# Patient Record
Sex: MALE | Race: OTHER | HISPANIC OR LATINO | URBAN - METROPOLITAN AREA
[De-identification: names, ages, dates, MRNs, and addresses within clinical notes are randomized per-mention and may not be internally consistent; named-entity substitution may affect disease eponyms.]

---

## 2022-01-01 ENCOUNTER — EMERGENCY (EMERGENCY)
Facility: HOSPITAL | Age: 42
LOS: 1 days | Discharge: HOME | End: 2022-01-01
Attending: EMERGENCY MEDICINE | Admitting: EMERGENCY MEDICINE
Payer: MEDICAID

## 2022-01-01 ENCOUNTER — INPATIENT (INPATIENT)
Facility: HOSPITAL | Age: 42
LOS: 3 days | DRG: 20 | End: 2022-05-08
Attending: NEUROLOGICAL SURGERY | Admitting: NEUROLOGICAL SURGERY
Payer: MEDICAID

## 2022-01-01 VITALS — SYSTOLIC BLOOD PRESSURE: 120 MMHG | TEMPERATURE: 98 F | DIASTOLIC BLOOD PRESSURE: 77 MMHG | HEART RATE: 70 BPM

## 2022-01-01 VITALS
RESPIRATION RATE: 18 BRPM | HEIGHT: 65 IN | DIASTOLIC BLOOD PRESSURE: 74 MMHG | TEMPERATURE: 98 F | HEART RATE: 62 BPM | WEIGHT: 163.58 LBS | OXYGEN SATURATION: 98 % | SYSTOLIC BLOOD PRESSURE: 121 MMHG

## 2022-01-01 VITALS
WEIGHT: 163.58 LBS | RESPIRATION RATE: 20 BRPM | HEART RATE: 79 BPM | HEIGHT: 65 IN | OXYGEN SATURATION: 91 % | TEMPERATURE: 100 F

## 2022-01-01 VITALS
SYSTOLIC BLOOD PRESSURE: 107 MMHG | OXYGEN SATURATION: 95 % | HEART RATE: 59 BPM | RESPIRATION RATE: 29 BRPM | DIASTOLIC BLOOD PRESSURE: 73 MMHG

## 2022-01-01 DIAGNOSIS — I62.00 NONTRAUMATIC SUBDURAL HEMORRHAGE, UNSPECIFIED: ICD-10-CM

## 2022-01-01 DIAGNOSIS — I60.9 NONTRAUMATIC SUBARACHNOID HEMORRHAGE, UNSPECIFIED: ICD-10-CM

## 2022-01-01 LAB
A1C WITH ESTIMATED AVERAGE GLUCOSE RESULT: 5.9 % — HIGH (ref 4–5.6)
A1C WITH ESTIMATED AVERAGE GLUCOSE RESULT: 6.1 % — HIGH (ref 4–5.6)
ALBUMIN SERPL ELPH-MCNC: 2.4 G/DL — LOW (ref 3.3–5)
ALBUMIN SERPL ELPH-MCNC: 2.4 G/DL — LOW (ref 3.3–5)
ALBUMIN SERPL ELPH-MCNC: 2.6 G/DL — LOW (ref 3.3–5)
ALBUMIN SERPL ELPH-MCNC: 2.7 G/DL — LOW (ref 3.3–5)
ALBUMIN SERPL ELPH-MCNC: 2.7 G/DL — LOW (ref 3.3–5)
ALBUMIN SERPL ELPH-MCNC: 2.8 G/DL — LOW (ref 3.3–5)
ALBUMIN SERPL ELPH-MCNC: 2.8 G/DL — LOW (ref 3.3–5)
ALBUMIN SERPL ELPH-MCNC: 2.9 G/DL — LOW (ref 3.3–5)
ALBUMIN SERPL ELPH-MCNC: 3.1 G/DL — LOW (ref 3.3–5)
ALBUMIN SERPL ELPH-MCNC: 3.2 G/DL — LOW (ref 3.3–5)
ALBUMIN SERPL ELPH-MCNC: 4.1 G/DL — SIGNIFICANT CHANGE UP (ref 3.3–5)
ALP SERPL-CCNC: 100 U/L — SIGNIFICANT CHANGE UP (ref 40–120)
ALP SERPL-CCNC: 117 U/L — SIGNIFICANT CHANGE UP (ref 40–120)
ALP SERPL-CCNC: 117 U/L — SIGNIFICANT CHANGE UP (ref 40–120)
ALP SERPL-CCNC: 120 U/L — SIGNIFICANT CHANGE UP (ref 40–120)
ALP SERPL-CCNC: 124 U/L — HIGH (ref 40–120)
ALP SERPL-CCNC: 125 U/L — HIGH (ref 40–120)
ALP SERPL-CCNC: 59 U/L — SIGNIFICANT CHANGE UP (ref 40–120)
ALP SERPL-CCNC: 61 U/L — SIGNIFICANT CHANGE UP (ref 40–120)
ALP SERPL-CCNC: 63 U/L — SIGNIFICANT CHANGE UP (ref 40–120)
ALP SERPL-CCNC: 69 U/L — SIGNIFICANT CHANGE UP (ref 40–120)
ALP SERPL-CCNC: 79 U/L — SIGNIFICANT CHANGE UP (ref 40–120)
ALP SERPL-CCNC: 85 U/L — SIGNIFICANT CHANGE UP (ref 40–120)
ALP SERPL-CCNC: 99 U/L — SIGNIFICANT CHANGE UP (ref 40–120)
ALT FLD-CCNC: 60 U/L — HIGH (ref 10–45)
ALT FLD-CCNC: 62 U/L — HIGH (ref 10–45)
ALT FLD-CCNC: 71 U/L — HIGH (ref 10–45)
ALT FLD-CCNC: 72 U/L — HIGH (ref 10–45)
ALT FLD-CCNC: 75 U/L — HIGH (ref 10–45)
ALT FLD-CCNC: 77 U/L — HIGH (ref 10–45)
ALT FLD-CCNC: 79 U/L — HIGH (ref 10–45)
ALT FLD-CCNC: 84 U/L — HIGH (ref 10–45)
ALT FLD-CCNC: 87 U/L — HIGH (ref 10–45)
ALT FLD-CCNC: 89 U/L — HIGH (ref 10–45)
ALT FLD-CCNC: 99 U/L — HIGH (ref 4–41)
AMPHET UR-MCNC: NEGATIVE — SIGNIFICANT CHANGE UP
AMYLASE P1 CFR SERPL: 110 U/L — SIGNIFICANT CHANGE UP (ref 25–125)
AMYLASE P1 CFR SERPL: 124 U/L — SIGNIFICANT CHANGE UP (ref 25–125)
AMYLASE P1 CFR SERPL: 155 U/L — HIGH (ref 25–125)
AMYLASE P1 CFR SERPL: 175 U/L — HIGH (ref 25–125)
AMYLASE P1 CFR SERPL: 221 U/L — HIGH (ref 25–125)
AMYLASE P1 CFR SERPL: 232 U/L — HIGH (ref 25–125)
AMYLASE P1 CFR SERPL: 268 U/L — HIGH (ref 25–125)
AMYLASE P1 CFR SERPL: 296 U/L — HIGH (ref 25–125)
AMYLASE P1 CFR SERPL: 309 U/L — HIGH (ref 25–125)
AMYLASE P1 CFR SERPL: 349 U/L — HIGH (ref 25–125)
AMYLASE P1 CFR SERPL: 66 U/L — SIGNIFICANT CHANGE UP (ref 25–125)
ANION GAP SERPL CALC-SCNC: 10 MMOL/L — SIGNIFICANT CHANGE UP (ref 5–17)
ANION GAP SERPL CALC-SCNC: 10 MMOL/L — SIGNIFICANT CHANGE UP (ref 5–17)
ANION GAP SERPL CALC-SCNC: 11 MMOL/L — SIGNIFICANT CHANGE UP (ref 5–17)
ANION GAP SERPL CALC-SCNC: 11 MMOL/L — SIGNIFICANT CHANGE UP (ref 5–17)
ANION GAP SERPL CALC-SCNC: 12 MMOL/L — SIGNIFICANT CHANGE UP (ref 5–17)
ANION GAP SERPL CALC-SCNC: 13 MMOL/L — SIGNIFICANT CHANGE UP (ref 5–17)
ANION GAP SERPL CALC-SCNC: 13 MMOL/L — SIGNIFICANT CHANGE UP (ref 5–17)
ANION GAP SERPL CALC-SCNC: 14 MMOL/L — SIGNIFICANT CHANGE UP (ref 5–17)
ANION GAP SERPL CALC-SCNC: 15 MMOL/L — SIGNIFICANT CHANGE UP (ref 5–17)
ANION GAP SERPL CALC-SCNC: 16 MMOL/L — HIGH (ref 7–14)
ANION GAP SERPL CALC-SCNC: 16 MMOL/L — SIGNIFICANT CHANGE UP (ref 5–17)
ANION GAP SERPL CALC-SCNC: 19 MMOL/L — HIGH (ref 5–17)
ANION GAP SERPL CALC-SCNC: 20 MMOL/L — HIGH (ref 5–17)
ANION GAP SERPL CALC-SCNC: 21 MMOL/L — HIGH (ref 5–17)
ANION GAP SERPL CALC-SCNC: 9 MMOL/L — SIGNIFICANT CHANGE UP (ref 5–17)
APAP SERPL-MCNC: <10 UG/ML — LOW (ref 15–25)
APPEARANCE UR: ABNORMAL
APPEARANCE UR: CLEAR — SIGNIFICANT CHANGE UP
APPEARANCE UR: CLEAR — SIGNIFICANT CHANGE UP
APTT BLD: 27.7 SEC — SIGNIFICANT CHANGE UP (ref 27.5–35.5)
APTT BLD: 28.3 SEC — SIGNIFICANT CHANGE UP (ref 27.5–35.5)
APTT BLD: 28.7 SEC — SIGNIFICANT CHANGE UP (ref 27.5–35.5)
APTT BLD: 29.3 SEC — SIGNIFICANT CHANGE UP (ref 27.5–35.5)
APTT BLD: 31.8 SEC — SIGNIFICANT CHANGE UP (ref 27.5–35.5)
APTT BLD: 31.9 SEC — SIGNIFICANT CHANGE UP (ref 27–36.3)
APTT BLD: 32.5 SEC — SIGNIFICANT CHANGE UP (ref 27.5–35.5)
APTT BLD: 32.7 SEC — SIGNIFICANT CHANGE UP (ref 27.5–35.5)
APTT BLD: 32.8 SEC — SIGNIFICANT CHANGE UP (ref 27.5–35.5)
APTT BLD: 32.9 SEC — SIGNIFICANT CHANGE UP (ref 27.5–35.5)
APTT BLD: 33 SEC — SIGNIFICANT CHANGE UP (ref 27.5–35.5)
APTT BLD: 34.1 SEC — SIGNIFICANT CHANGE UP (ref 27.5–35.5)
AST SERPL-CCNC: 105 U/L — HIGH (ref 10–40)
AST SERPL-CCNC: 121 U/L — HIGH (ref 10–40)
AST SERPL-CCNC: 124 U/L — HIGH (ref 10–40)
AST SERPL-CCNC: 148 U/L — HIGH (ref 10–40)
AST SERPL-CCNC: 152 U/L — HIGH (ref 10–40)
AST SERPL-CCNC: 157 U/L — HIGH (ref 10–40)
AST SERPL-CCNC: 167 U/L — HIGH (ref 10–40)
AST SERPL-CCNC: 185 U/L — HIGH (ref 10–40)
AST SERPL-CCNC: 74 U/L — HIGH (ref 10–40)
AST SERPL-CCNC: 81 U/L — HIGH (ref 10–40)
AST SERPL-CCNC: 82 U/L — HIGH (ref 10–40)
AST SERPL-CCNC: 84 U/L — HIGH (ref 10–40)
AST SERPL-CCNC: 85 U/L — HIGH (ref 4–40)
B PERT DNA SPEC QL NAA+PROBE: SIGNIFICANT CHANGE UP
B PERT+PARAPERT DNA PNL SPEC NAA+PROBE: SIGNIFICANT CHANGE UP
BACTERIA # UR AUTO: NEGATIVE — SIGNIFICANT CHANGE UP
BACTERIA # UR AUTO: NEGATIVE — SIGNIFICANT CHANGE UP
BARBITURATES UR SCN-MCNC: NEGATIVE — SIGNIFICANT CHANGE UP
BASE EXCESS BLDA CALC-SCNC: -7.9 MMOL/L — LOW (ref -2–3)
BASE EXCESS BLDV CALC-SCNC: -12.6 MMOL/L — LOW (ref -2–2)
BASE EXCESS BLDV CALC-SCNC: -3.9 MMOL/L — LOW (ref -2–3)
BASOPHILS # BLD AUTO: 0 K/UL — SIGNIFICANT CHANGE UP (ref 0–0.2)
BASOPHILS # BLD AUTO: 0.01 K/UL — SIGNIFICANT CHANGE UP (ref 0–0.2)
BASOPHILS # BLD AUTO: 0.02 K/UL — SIGNIFICANT CHANGE UP (ref 0–0.2)
BASOPHILS NFR BLD AUTO: 0 % — SIGNIFICANT CHANGE UP (ref 0–2)
BASOPHILS NFR BLD AUTO: 0.1 % — SIGNIFICANT CHANGE UP (ref 0–2)
BASOPHILS NFR BLD AUTO: 0.2 % — SIGNIFICANT CHANGE UP (ref 0–2)
BENZODIAZ UR-MCNC: POSITIVE
BILIRUB DIRECT SERPL-MCNC: 0.2 MG/DL — SIGNIFICANT CHANGE UP (ref 0–0.3)
BILIRUB DIRECT SERPL-MCNC: 0.3 MG/DL — SIGNIFICANT CHANGE UP (ref 0–0.3)
BILIRUB DIRECT SERPL-MCNC: 0.3 MG/DL — SIGNIFICANT CHANGE UP (ref 0–0.3)
BILIRUB DIRECT SERPL-MCNC: 0.4 MG/DL — HIGH (ref 0–0.3)
BILIRUB DIRECT SERPL-MCNC: 0.4 MG/DL — HIGH (ref 0–0.3)
BILIRUB DIRECT SERPL-MCNC: 0.5 MG/DL — HIGH (ref 0–0.3)
BILIRUB DIRECT SERPL-MCNC: 0.5 MG/DL — HIGH (ref 0–0.3)
BILIRUB DIRECT SERPL-MCNC: 0.6 MG/DL — HIGH (ref 0–0.3)
BILIRUB DIRECT SERPL-MCNC: 0.8 MG/DL — HIGH (ref 0–0.3)
BILIRUB INDIRECT FLD-MCNC: 0.3 MG/DL — SIGNIFICANT CHANGE UP (ref 0.2–1)
BILIRUB INDIRECT FLD-MCNC: 0.4 MG/DL — SIGNIFICANT CHANGE UP (ref 0.2–1)
BILIRUB INDIRECT FLD-MCNC: 0.6 MG/DL — SIGNIFICANT CHANGE UP (ref 0.2–1)
BILIRUB INDIRECT FLD-MCNC: 0.7 MG/DL — SIGNIFICANT CHANGE UP (ref 0.2–1)
BILIRUB INDIRECT FLD-MCNC: 0.8 MG/DL — SIGNIFICANT CHANGE UP (ref 0.2–1)
BILIRUB INDIRECT FLD-MCNC: 1 MG/DL — SIGNIFICANT CHANGE UP (ref 0.2–1)
BILIRUB INDIRECT FLD-MCNC: 1.2 MG/DL — HIGH (ref 0.2–1)
BILIRUB INDIRECT FLD-MCNC: 1.2 MG/DL — HIGH (ref 0.2–1)
BILIRUB SERPL-MCNC: 0.3 MG/DL — SIGNIFICANT CHANGE UP (ref 0.2–1.2)
BILIRUB SERPL-MCNC: 0.5 MG/DL — SIGNIFICANT CHANGE UP (ref 0.2–1.2)
BILIRUB SERPL-MCNC: 0.6 MG/DL — SIGNIFICANT CHANGE UP (ref 0.2–1.2)
BILIRUB SERPL-MCNC: 0.7 MG/DL — SIGNIFICANT CHANGE UP (ref 0.2–1.2)
BILIRUB SERPL-MCNC: 0.8 MG/DL — SIGNIFICANT CHANGE UP (ref 0.2–1.2)
BILIRUB SERPL-MCNC: 0.9 MG/DL — SIGNIFICANT CHANGE UP (ref 0.2–1.2)
BILIRUB SERPL-MCNC: 0.9 MG/DL — SIGNIFICANT CHANGE UP (ref 0.2–1.2)
BILIRUB SERPL-MCNC: 1 MG/DL — SIGNIFICANT CHANGE UP (ref 0.2–1.2)
BILIRUB SERPL-MCNC: 1.2 MG/DL — SIGNIFICANT CHANGE UP (ref 0.2–1.2)
BILIRUB SERPL-MCNC: 1.2 MG/DL — SIGNIFICANT CHANGE UP (ref 0.2–1.2)
BILIRUB SERPL-MCNC: 1.5 MG/DL — HIGH (ref 0.2–1.2)
BILIRUB SERPL-MCNC: 1.8 MG/DL — HIGH (ref 0.2–1.2)
BILIRUB SERPL-MCNC: 2 MG/DL — HIGH (ref 0.2–1.2)
BILIRUB UR-MCNC: NEGATIVE — SIGNIFICANT CHANGE UP
BLD GP AB SCN SERPL QL: NEGATIVE — SIGNIFICANT CHANGE UP
BLD GP AB SCN SERPL QL: NEGATIVE — SIGNIFICANT CHANGE UP
BLOOD GAS VENOUS COMPREHENSIVE RESULT: SIGNIFICANT CHANGE UP
BORDETELLA PARAPERTUSSIS (RAPRVP): SIGNIFICANT CHANGE UP
BUN SERPL-MCNC: 11 MG/DL — SIGNIFICANT CHANGE UP (ref 7–23)
BUN SERPL-MCNC: 12 MG/DL — SIGNIFICANT CHANGE UP (ref 7–23)
BUN SERPL-MCNC: 13 MG/DL — SIGNIFICANT CHANGE UP (ref 7–23)
BUN SERPL-MCNC: 13 MG/DL — SIGNIFICANT CHANGE UP (ref 7–23)
BUN SERPL-MCNC: 14 MG/DL — SIGNIFICANT CHANGE UP (ref 7–23)
BUN SERPL-MCNC: 14 MG/DL — SIGNIFICANT CHANGE UP (ref 7–23)
BUN SERPL-MCNC: 16 MG/DL — SIGNIFICANT CHANGE UP (ref 7–23)
BUN SERPL-MCNC: 18 MG/DL — SIGNIFICANT CHANGE UP (ref 7–23)
BUN SERPL-MCNC: 18 MG/DL — SIGNIFICANT CHANGE UP (ref 7–23)
BUN SERPL-MCNC: 20 MG/DL — SIGNIFICANT CHANGE UP (ref 7–23)
BUN SERPL-MCNC: 20 MG/DL — SIGNIFICANT CHANGE UP (ref 7–23)
BUN SERPL-MCNC: 22 MG/DL — SIGNIFICANT CHANGE UP (ref 7–23)
BUN SERPL-MCNC: 22 MG/DL — SIGNIFICANT CHANGE UP (ref 7–23)
BUN SERPL-MCNC: 23 MG/DL — SIGNIFICANT CHANGE UP (ref 7–23)
BUN SERPL-MCNC: 24 MG/DL — HIGH (ref 7–23)
BUN SERPL-MCNC: 30 MG/DL — HIGH (ref 7–23)
BUN SERPL-MCNC: 35 MG/DL — HIGH (ref 7–23)
C PNEUM DNA SPEC QL NAA+PROBE: SIGNIFICANT CHANGE UP
CALCIUM SERPL-MCNC: 10.7 MG/DL — HIGH (ref 8.4–10.5)
CALCIUM SERPL-MCNC: 6.5 MG/DL — CRITICAL LOW (ref 8.4–10.5)
CALCIUM SERPL-MCNC: 7 MG/DL — LOW (ref 8.4–10.5)
CALCIUM SERPL-MCNC: 7 MG/DL — LOW (ref 8.4–10.5)
CALCIUM SERPL-MCNC: 7.1 MG/DL — LOW (ref 8.4–10.5)
CALCIUM SERPL-MCNC: 7.3 MG/DL — LOW (ref 8.4–10.5)
CALCIUM SERPL-MCNC: 7.4 MG/DL — LOW (ref 8.4–10.5)
CALCIUM SERPL-MCNC: 7.5 MG/DL — LOW (ref 8.4–10.5)
CALCIUM SERPL-MCNC: 7.6 MG/DL — LOW (ref 8.4–10.5)
CALCIUM SERPL-MCNC: 7.7 MG/DL — LOW (ref 8.4–10.5)
CALCIUM SERPL-MCNC: 7.7 MG/DL — LOW (ref 8.4–10.5)
CALCIUM SERPL-MCNC: 7.9 MG/DL — LOW (ref 8.4–10.5)
CALCIUM SERPL-MCNC: 8.1 MG/DL — LOW (ref 8.4–10.5)
CALCIUM SERPL-MCNC: 8.2 MG/DL — LOW (ref 8.4–10.5)
CALCIUM SERPL-MCNC: 8.3 MG/DL — LOW (ref 8.4–10.5)
CALCIUM SERPL-MCNC: 8.6 MG/DL — SIGNIFICANT CHANGE UP (ref 8.4–10.5)
CALCIUM SERPL-MCNC: 9.1 MG/DL — SIGNIFICANT CHANGE UP (ref 8.4–10.5)
CHLORIDE BLDV-SCNC: 101 MMOL/L — SIGNIFICANT CHANGE UP (ref 96–108)
CHLORIDE SERPL-SCNC: 100 MMOL/L — SIGNIFICANT CHANGE UP (ref 98–107)
CHLORIDE SERPL-SCNC: 101 MMOL/L — SIGNIFICANT CHANGE UP (ref 96–108)
CHLORIDE SERPL-SCNC: 102 MMOL/L — SIGNIFICANT CHANGE UP (ref 96–108)
CHLORIDE SERPL-SCNC: 117 MMOL/L — HIGH (ref 96–108)
CHLORIDE SERPL-SCNC: 118 MMOL/L — HIGH (ref 96–108)
CHLORIDE SERPL-SCNC: 120 MMOL/L — HIGH (ref 96–108)
CHLORIDE SERPL-SCNC: 121 MMOL/L — HIGH (ref 96–108)
CHLORIDE SERPL-SCNC: 122 MMOL/L — HIGH (ref 96–108)
CHLORIDE SERPL-SCNC: 125 MMOL/L — HIGH (ref 96–108)
CHLORIDE SERPL-SCNC: 132 MMOL/L — HIGH (ref 96–108)
CHLORIDE SERPL-SCNC: >135 MMOL/L — HIGH (ref 96–108)
CHOLEST SERPL-MCNC: 217 MG/DL — HIGH
CK MB BLD-MCNC: 0.5 % — SIGNIFICANT CHANGE UP (ref 0–3.5)
CK MB BLD-MCNC: 0.6 % — SIGNIFICANT CHANGE UP (ref 0–3.5)
CK MB BLD-MCNC: 0.8 % — SIGNIFICANT CHANGE UP (ref 0–3.5)
CK MB BLD-MCNC: 0.9 % — SIGNIFICANT CHANGE UP (ref 0–3.5)
CK MB BLD-MCNC: 1 % — SIGNIFICANT CHANGE UP (ref 0–3.5)
CK MB BLD-MCNC: 1.1 % — SIGNIFICANT CHANGE UP (ref 0–3.5)
CK MB BLD-MCNC: 1.5 % — SIGNIFICANT CHANGE UP (ref 0–3.5)
CK MB BLD-MCNC: 2.2 % — SIGNIFICANT CHANGE UP (ref 0–3.5)
CK MB BLD-MCNC: 7.6 % — HIGH (ref 0–3.5)
CK MB CFR SERPL CALC: 10.8 NG/ML — HIGH (ref 0–6.7)
CK MB CFR SERPL CALC: 14.4 NG/ML — HIGH (ref 0–6.7)
CK MB CFR SERPL CALC: 16.5 NG/ML — HIGH (ref 0–6.7)
CK MB CFR SERPL CALC: 23.4 NG/ML — HIGH (ref 0–6.7)
CK MB CFR SERPL CALC: 29.2 NG/ML — HIGH (ref 0–6.7)
CK MB CFR SERPL CALC: 40.1 NG/ML — HIGH (ref 0–6.7)
CK MB CFR SERPL CALC: 57.3 NG/ML — HIGH (ref 0–6.7)
CK MB CFR SERPL CALC: 62 NG/ML — HIGH (ref 0–6.7)
CK MB CFR SERPL CALC: 63.6 NG/ML — HIGH (ref 0–6.7)
CK MB CFR SERPL CALC: 66.8 NG/ML — HIGH (ref 0–6.7)
CK MB CFR SERPL CALC: 82.6 NG/ML — HIGH (ref 0–6.7)
CK SERPL-CCNC: 143 U/L — SIGNIFICANT CHANGE UP (ref 30–200)
CK SERPL-CCNC: 1973 U/L — HIGH (ref 30–200)
CK SERPL-CCNC: 3152 U/L — HIGH (ref 30–200)
CK SERPL-CCNC: 3935 U/L — HIGH (ref 30–200)
CK SERPL-CCNC: 5056 U/L — HIGH (ref 30–200)
CK SERPL-CCNC: 5122 U/L — HIGH (ref 30–200)
CK SERPL-CCNC: 6436 U/L — HIGH (ref 30–200)
CK SERPL-CCNC: 6623 U/L — HIGH (ref 30–200)
CK SERPL-CCNC: 6853 U/L — HIGH (ref 30–200)
CK SERPL-CCNC: 740 U/L — HIGH (ref 30–200)
CK SERPL-CCNC: 8675 U/L — HIGH (ref 30–200)
CO2 BLDA-SCNC: 18 MMOL/L — LOW (ref 19–24)
CO2 BLDV-SCNC: 16 MMOL/L — LOW (ref 22–26)
CO2 BLDV-SCNC: 27.6 MMOL/L — HIGH (ref 22–26)
CO2 SERPL-SCNC: 12 MMOL/L — LOW (ref 22–31)
CO2 SERPL-SCNC: 14 MMOL/L — LOW (ref 22–31)
CO2 SERPL-SCNC: 15 MMOL/L — LOW (ref 22–31)
CO2 SERPL-SCNC: 16 MMOL/L — LOW (ref 22–31)
CO2 SERPL-SCNC: 17 MMOL/L — LOW (ref 22–31)
CO2 SERPL-SCNC: 17 MMOL/L — LOW (ref 22–31)
CO2 SERPL-SCNC: 19 MMOL/L — LOW (ref 22–31)
CO2 SERPL-SCNC: 19 MMOL/L — LOW (ref 22–31)
CO2 SERPL-SCNC: 20 MMOL/L — LOW (ref 22–31)
CO2 SERPL-SCNC: 21 MMOL/L — LOW (ref 22–31)
COCAINE METAB.OTHER UR-MCNC: NEGATIVE — SIGNIFICANT CHANGE UP
COLOR SPEC: COLORLESS — SIGNIFICANT CHANGE UP
COLOR SPEC: YELLOW — SIGNIFICANT CHANGE UP
COLOR SPEC: YELLOW — SIGNIFICANT CHANGE UP
CREAT SERPL-MCNC: 0.75 MG/DL — SIGNIFICANT CHANGE UP (ref 0.5–1.3)
CREAT SERPL-MCNC: 0.78 MG/DL — SIGNIFICANT CHANGE UP (ref 0.5–1.3)
CREAT SERPL-MCNC: 0.83 MG/DL — SIGNIFICANT CHANGE UP (ref 0.5–1.3)
CREAT SERPL-MCNC: 1 MG/DL — SIGNIFICANT CHANGE UP (ref 0.5–1.3)
CREAT SERPL-MCNC: 1.01 MG/DL — SIGNIFICANT CHANGE UP (ref 0.5–1.3)
CREAT SERPL-MCNC: 1.04 MG/DL — SIGNIFICANT CHANGE UP (ref 0.5–1.3)
CREAT SERPL-MCNC: 1.06 MG/DL — SIGNIFICANT CHANGE UP (ref 0.5–1.3)
CREAT SERPL-MCNC: 1.07 MG/DL — SIGNIFICANT CHANGE UP (ref 0.5–1.3)
CREAT SERPL-MCNC: 1.08 MG/DL — SIGNIFICANT CHANGE UP (ref 0.5–1.3)
CREAT SERPL-MCNC: 1.09 MG/DL — SIGNIFICANT CHANGE UP (ref 0.5–1.3)
CREAT SERPL-MCNC: 1.1 MG/DL — SIGNIFICANT CHANGE UP (ref 0.5–1.3)
CREAT SERPL-MCNC: 1.1 MG/DL — SIGNIFICANT CHANGE UP (ref 0.5–1.3)
CREAT SERPL-MCNC: 1.13 MG/DL — SIGNIFICANT CHANGE UP (ref 0.5–1.3)
CREAT SERPL-MCNC: 1.15 MG/DL — SIGNIFICANT CHANGE UP (ref 0.5–1.3)
CREAT SERPL-MCNC: 1.47 MG/DL — HIGH (ref 0.5–1.3)
CREAT SERPL-MCNC: 1.5 MG/DL — HIGH (ref 0.5–1.3)
CREAT SERPL-MCNC: 1.5 MG/DL — HIGH (ref 0.5–1.3)
DACRYOCYTES BLD QL SMEAR: SLIGHT — SIGNIFICANT CHANGE UP
DIFF PNL FLD: ABNORMAL
DIFF PNL FLD: ABNORMAL
DIFF PNL FLD: NEGATIVE — SIGNIFICANT CHANGE UP
EGFR: 112 ML/MIN/1.73M2 — SIGNIFICANT CHANGE UP
EGFR: 114 ML/MIN/1.73M2 — SIGNIFICANT CHANGE UP
EGFR: 116 ML/MIN/1.73M2 — SIGNIFICANT CHANGE UP
EGFR: 59 ML/MIN/1.73M2 — LOW
EGFR: 59 ML/MIN/1.73M2 — LOW
EGFR: 61 ML/MIN/1.73M2 — SIGNIFICANT CHANGE UP
EGFR: 81 ML/MIN/1.73M2 — SIGNIFICANT CHANGE UP
EGFR: 83 ML/MIN/1.73M2 — SIGNIFICANT CHANGE UP
EGFR: 86 ML/MIN/1.73M2 — SIGNIFICANT CHANGE UP
EGFR: 86 ML/MIN/1.73M2 — SIGNIFICANT CHANGE UP
EGFR: 87 ML/MIN/1.73M2 — SIGNIFICANT CHANGE UP
EGFR: 88 ML/MIN/1.73M2 — SIGNIFICANT CHANGE UP
EGFR: 89 ML/MIN/1.73M2 — SIGNIFICANT CHANGE UP
EGFR: 90 ML/MIN/1.73M2 — SIGNIFICANT CHANGE UP
EGFR: 92 ML/MIN/1.73M2 — SIGNIFICANT CHANGE UP
EGFR: 95 ML/MIN/1.73M2 — SIGNIFICANT CHANGE UP
EGFR: 96 ML/MIN/1.73M2 — SIGNIFICANT CHANGE UP
EOSINOPHIL # BLD AUTO: 0 K/UL — SIGNIFICANT CHANGE UP (ref 0–0.5)
EOSINOPHIL # BLD AUTO: 0.01 K/UL — SIGNIFICANT CHANGE UP (ref 0–0.5)
EOSINOPHIL # BLD AUTO: 0.04 K/UL — SIGNIFICANT CHANGE UP (ref 0–0.5)
EOSINOPHIL # BLD AUTO: 0.08 K/UL — SIGNIFICANT CHANGE UP (ref 0–0.5)
EOSINOPHIL # BLD AUTO: 0.08 K/UL — SIGNIFICANT CHANGE UP (ref 0–0.5)
EOSINOPHIL # BLD AUTO: 0.1 K/UL — SIGNIFICANT CHANGE UP (ref 0–0.5)
EOSINOPHIL # BLD AUTO: 0.4 K/UL — SIGNIFICANT CHANGE UP (ref 0–0.5)
EOSINOPHIL NFR BLD AUTO: 0 % — SIGNIFICANT CHANGE UP (ref 0–6)
EOSINOPHIL NFR BLD AUTO: 0.1 % — SIGNIFICANT CHANGE UP (ref 0–6)
EOSINOPHIL NFR BLD AUTO: 0.4 % — SIGNIFICANT CHANGE UP (ref 0–6)
EOSINOPHIL NFR BLD AUTO: 0.7 % — SIGNIFICANT CHANGE UP (ref 0–6)
EOSINOPHIL NFR BLD AUTO: 1 % — SIGNIFICANT CHANGE UP (ref 0–6)
EOSINOPHIL NFR BLD AUTO: 1.1 % — SIGNIFICANT CHANGE UP (ref 0–6)
EOSINOPHIL NFR BLD AUTO: 2.8 % — SIGNIFICANT CHANGE UP (ref 0–6)
EPI CELLS # UR: 1 /HPF — SIGNIFICANT CHANGE UP
EPI CELLS # UR: 9 /HPF — HIGH
ESTIMATED AVERAGE GLUCOSE: 123 MG/DL — HIGH (ref 68–114)
ESTIMATED AVERAGE GLUCOSE: 128 MG/DL — HIGH (ref 68–114)
ETHANOL SERPL-MCNC: <10 MG/DL — SIGNIFICANT CHANGE UP
ETHANOL SERPL-MCNC: SIGNIFICANT CHANGE UP MG/DL (ref 0–10)
FLUAV SUBTYP SPEC NAA+PROBE: SIGNIFICANT CHANGE UP
FLUBV RNA SPEC QL NAA+PROBE: SIGNIFICANT CHANGE UP
GAS PNL BLDA: SIGNIFICANT CHANGE UP
GAS PNL BLDV: 135 MMOL/L — LOW (ref 136–145)
GAS PNL BLDV: SIGNIFICANT CHANGE UP
GIANT PLATELETS BLD QL SMEAR: PRESENT — SIGNIFICANT CHANGE UP
GLUCOSE BLDC GLUCOMTR-MCNC: 109 MG/DL — HIGH (ref 70–99)
GLUCOSE BLDC GLUCOMTR-MCNC: 130 MG/DL — HIGH (ref 70–99)
GLUCOSE BLDC GLUCOMTR-MCNC: 138 MG/DL — HIGH (ref 70–99)
GLUCOSE BLDC GLUCOMTR-MCNC: 139 MG/DL — HIGH (ref 70–99)
GLUCOSE BLDC GLUCOMTR-MCNC: 149 MG/DL — HIGH (ref 70–99)
GLUCOSE BLDC GLUCOMTR-MCNC: 152 MG/DL — HIGH (ref 70–99)
GLUCOSE BLDC GLUCOMTR-MCNC: 185 MG/DL — HIGH (ref 70–99)
GLUCOSE BLDC GLUCOMTR-MCNC: 194 MG/DL — HIGH (ref 70–99)
GLUCOSE BLDC GLUCOMTR-MCNC: 213 MG/DL — HIGH (ref 70–99)
GLUCOSE BLDC GLUCOMTR-MCNC: 221 MG/DL — HIGH (ref 70–99)
GLUCOSE BLDC GLUCOMTR-MCNC: 224 MG/DL — HIGH (ref 70–99)
GLUCOSE BLDC GLUCOMTR-MCNC: 231 MG/DL — HIGH (ref 70–99)
GLUCOSE BLDC GLUCOMTR-MCNC: 240 MG/DL — HIGH (ref 70–99)
GLUCOSE BLDC GLUCOMTR-MCNC: 248 MG/DL — HIGH (ref 70–99)
GLUCOSE BLDC GLUCOMTR-MCNC: 260 MG/DL — HIGH (ref 70–99)
GLUCOSE BLDV-MCNC: 274 MG/DL — HIGH (ref 70–99)
GLUCOSE SERPL-MCNC: 125 MG/DL — HIGH (ref 70–99)
GLUCOSE SERPL-MCNC: 136 MG/DL — HIGH (ref 70–99)
GLUCOSE SERPL-MCNC: 148 MG/DL — HIGH (ref 70–99)
GLUCOSE SERPL-MCNC: 159 MG/DL — HIGH (ref 70–99)
GLUCOSE SERPL-MCNC: 162 MG/DL — HIGH (ref 70–99)
GLUCOSE SERPL-MCNC: 187 MG/DL — HIGH (ref 70–99)
GLUCOSE SERPL-MCNC: 195 MG/DL — HIGH (ref 70–99)
GLUCOSE SERPL-MCNC: 217 MG/DL — HIGH (ref 70–99)
GLUCOSE SERPL-MCNC: 225 MG/DL — HIGH (ref 70–99)
GLUCOSE SERPL-MCNC: 237 MG/DL — HIGH (ref 70–99)
GLUCOSE SERPL-MCNC: 249 MG/DL — HIGH (ref 70–99)
GLUCOSE SERPL-MCNC: 254 MG/DL — HIGH (ref 70–99)
GLUCOSE SERPL-MCNC: 255 MG/DL — HIGH (ref 70–99)
GLUCOSE SERPL-MCNC: 258 MG/DL — HIGH (ref 70–99)
GLUCOSE SERPL-MCNC: 262 MG/DL — HIGH (ref 70–99)
GLUCOSE SERPL-MCNC: 272 MG/DL — HIGH (ref 70–99)
GLUCOSE SERPL-MCNC: 279 MG/DL — HIGH (ref 70–99)
GLUCOSE UR QL: ABNORMAL
GLUCOSE UR QL: NEGATIVE — SIGNIFICANT CHANGE UP
GLUCOSE UR QL: NEGATIVE — SIGNIFICANT CHANGE UP
GRAN CASTS # UR COMP ASSIST: 2 /LPF — SIGNIFICANT CHANGE UP
HADV DNA SPEC QL NAA+PROBE: DETECTED
HCO3 BLDA-SCNC: 17 MMOL/L — LOW (ref 21–28)
HCO3 BLDV-SCNC: 15 MMOL/L — LOW (ref 22–29)
HCO3 BLDV-SCNC: 26 MMOL/L — SIGNIFICANT CHANGE UP (ref 22–29)
HCOV 229E RNA SPEC QL NAA+PROBE: SIGNIFICANT CHANGE UP
HCOV HKU1 RNA SPEC QL NAA+PROBE: SIGNIFICANT CHANGE UP
HCOV NL63 RNA SPEC QL NAA+PROBE: SIGNIFICANT CHANGE UP
HCOV OC43 RNA SPEC QL NAA+PROBE: SIGNIFICANT CHANGE UP
HCT VFR BLD CALC: 26.2 % — LOW (ref 39–50)
HCT VFR BLD CALC: 27.5 % — LOW (ref 39–50)
HCT VFR BLD CALC: 27.5 % — LOW (ref 39–50)
HCT VFR BLD CALC: 29.4 % — LOW (ref 39–50)
HCT VFR BLD CALC: 29.4 % — LOW (ref 39–50)
HCT VFR BLD CALC: 31.1 % — LOW (ref 39–50)
HCT VFR BLD CALC: 32 % — LOW (ref 39–50)
HCT VFR BLD CALC: 32.6 % — LOW (ref 39–50)
HCT VFR BLD CALC: 33 % — LOW (ref 39–50)
HCT VFR BLD CALC: 34.6 % — LOW (ref 39–50)
HCT VFR BLD CALC: 37 % — LOW (ref 39–50)
HCT VFR BLD CALC: 46.8 % — SIGNIFICANT CHANGE UP (ref 39–50)
HCT VFR BLD CALC: 47 % — SIGNIFICANT CHANGE UP (ref 39–50)
HCT VFR BLDA CALC: 48 % — SIGNIFICANT CHANGE UP (ref 39–51)
HDLC SERPL-MCNC: 39 MG/DL — LOW
HGB BLD CALC-MCNC: 16.1 G/DL — SIGNIFICANT CHANGE UP (ref 13–17)
HGB BLD-MCNC: 10 G/DL — LOW (ref 13–17)
HGB BLD-MCNC: 10.2 G/DL — LOW (ref 13–17)
HGB BLD-MCNC: 10.5 G/DL — LOW (ref 13–17)
HGB BLD-MCNC: 10.7 G/DL — LOW (ref 13–17)
HGB BLD-MCNC: 11 G/DL — LOW (ref 13–17)
HGB BLD-MCNC: 11.9 G/DL — LOW (ref 13–17)
HGB BLD-MCNC: 16.1 G/DL — SIGNIFICANT CHANGE UP (ref 13–17)
HGB BLD-MCNC: 16.4 G/DL — SIGNIFICANT CHANGE UP (ref 13–17)
HGB BLD-MCNC: 8.6 G/DL — LOW (ref 13–17)
HGB BLD-MCNC: 9 G/DL — LOW (ref 13–17)
HGB BLD-MCNC: 9.1 G/DL — LOW (ref 13–17)
HGB BLD-MCNC: 9.6 G/DL — LOW (ref 13–17)
HGB BLD-MCNC: 9.8 G/DL — LOW (ref 13–17)
HIV 1+2 AB+HIV1 P24 AG SERPL QL IA: SIGNIFICANT CHANGE UP
HMPV RNA SPEC QL NAA+PROBE: SIGNIFICANT CHANGE UP
HOROWITZ INDEX BLDA+IHG-RTO: 40 — SIGNIFICANT CHANGE UP
HPIV1 RNA SPEC QL NAA+PROBE: SIGNIFICANT CHANGE UP
HPIV2 RNA SPEC QL NAA+PROBE: SIGNIFICANT CHANGE UP
HPIV3 RNA SPEC QL NAA+PROBE: SIGNIFICANT CHANGE UP
HPIV4 RNA SPEC QL NAA+PROBE: SIGNIFICANT CHANGE UP
HYALINE CASTS # UR AUTO: 2 /LPF — SIGNIFICANT CHANGE UP (ref 0–2)
HYALINE CASTS # UR AUTO: 9 /LPF — HIGH (ref 0–2)
IANC: 5.58 K/UL — SIGNIFICANT CHANGE UP (ref 1.8–7.4)
IMM GRANULOCYTES NFR BLD AUTO: 0.6 % — SIGNIFICANT CHANGE UP (ref 0–1.5)
IMM GRANULOCYTES NFR BLD AUTO: 0.7 % — SIGNIFICANT CHANGE UP (ref 0–1.5)
IMM GRANULOCYTES NFR BLD AUTO: 0.7 % — SIGNIFICANT CHANGE UP (ref 0–1.5)
IMM GRANULOCYTES NFR BLD AUTO: 0.8 % — SIGNIFICANT CHANGE UP (ref 0–1.5)
IMM GRANULOCYTES NFR BLD AUTO: 0.9 % — SIGNIFICANT CHANGE UP (ref 0–1.5)
IMM GRANULOCYTES NFR BLD AUTO: 1.4 % — SIGNIFICANT CHANGE UP (ref 0–1.5)
INR BLD: 1.07 RATIO — SIGNIFICANT CHANGE UP (ref 0.88–1.16)
INR BLD: 1.21 RATIO — HIGH (ref 0.88–1.16)
INR BLD: 1.23 RATIO — HIGH (ref 0.88–1.16)
INR BLD: 1.25 RATIO — HIGH (ref 0.88–1.16)
INR BLD: 1.26 RATIO — HIGH (ref 0.88–1.16)
INR BLD: 1.28 RATIO — HIGH (ref 0.88–1.16)
INR BLD: 1.28 RATIO — HIGH (ref 0.88–1.16)
INR BLD: 1.31 RATIO — HIGH (ref 0.88–1.16)
INR BLD: 1.31 RATIO — HIGH (ref 0.88–1.16)
INR BLD: 1.33 RATIO — HIGH (ref 0.88–1.16)
INR BLD: 1.34 RATIO — HIGH (ref 0.88–1.16)
INR BLD: 1.35 RATIO — HIGH (ref 0.88–1.16)
KETONES UR-MCNC: NEGATIVE — SIGNIFICANT CHANGE UP
KETONES UR-MCNC: NEGATIVE — SIGNIFICANT CHANGE UP
KETONES UR-MCNC: SIGNIFICANT CHANGE UP
LACTATE BLDV-MCNC: 4.2 MMOL/L — CRITICAL HIGH (ref 0.5–2)
LACTATE SERPL-SCNC: 4 MMOL/L — CRITICAL HIGH (ref 0.7–2)
LACTATE SERPL-SCNC: 5.3 MMOL/L — CRITICAL HIGH (ref 0.5–2)
LEUKOCYTE ESTERASE UR-ACNC: NEGATIVE — SIGNIFICANT CHANGE UP
LIDOCAIN IGE QN: 12 U/L — SIGNIFICANT CHANGE UP (ref 7–60)
LIDOCAIN IGE QN: 13 U/L — SIGNIFICANT CHANGE UP (ref 7–60)
LIDOCAIN IGE QN: 13 U/L — SIGNIFICANT CHANGE UP (ref 7–60)
LIDOCAIN IGE QN: 14 U/L — SIGNIFICANT CHANGE UP (ref 7–60)
LIDOCAIN IGE QN: 15 U/L — SIGNIFICANT CHANGE UP (ref 7–60)
LIDOCAIN IGE QN: 16 U/L — SIGNIFICANT CHANGE UP (ref 7–60)
LIDOCAIN IGE QN: 17 U/L — SIGNIFICANT CHANGE UP (ref 7–60)
LIDOCAIN IGE QN: 17 U/L — SIGNIFICANT CHANGE UP (ref 7–60)
LIDOCAIN IGE QN: 18 U/L — SIGNIFICANT CHANGE UP (ref 7–60)
LIDOCAIN IGE QN: 28 U/L — SIGNIFICANT CHANGE UP (ref 7–60)
LIDOCAIN IGE QN: 47 U/L — SIGNIFICANT CHANGE UP (ref 7–60)
LIPID PNL WITH DIRECT LDL SERPL: 125 MG/DL — HIGH
LYMPHOCYTES # BLD AUTO: 0.4 K/UL — LOW (ref 1–3.3)
LYMPHOCYTES # BLD AUTO: 0.4 K/UL — LOW (ref 1–3.3)
LYMPHOCYTES # BLD AUTO: 0.46 K/UL — LOW (ref 1–3.3)
LYMPHOCYTES # BLD AUTO: 0.76 K/UL — LOW (ref 1–3.3)
LYMPHOCYTES # BLD AUTO: 1.38 K/UL — SIGNIFICANT CHANGE UP (ref 1–3.3)
LYMPHOCYTES # BLD AUTO: 1.52 K/UL — SIGNIFICANT CHANGE UP (ref 1–3.3)
LYMPHOCYTES # BLD AUTO: 1.54 K/UL — SIGNIFICANT CHANGE UP (ref 1–3.3)
LYMPHOCYTES # BLD AUTO: 1.89 K/UL — SIGNIFICANT CHANGE UP (ref 1–3.3)
LYMPHOCYTES # BLD AUTO: 1.93 K/UL — SIGNIFICANT CHANGE UP (ref 1–3.3)
LYMPHOCYTES # BLD AUTO: 10.9 % — LOW (ref 13–44)
LYMPHOCYTES # BLD AUTO: 13.8 % — SIGNIFICANT CHANGE UP (ref 13–44)
LYMPHOCYTES # BLD AUTO: 14.2 % — SIGNIFICANT CHANGE UP (ref 13–44)
LYMPHOCYTES # BLD AUTO: 14.2 % — SIGNIFICANT CHANGE UP (ref 13–44)
LYMPHOCYTES # BLD AUTO: 14.6 % — SIGNIFICANT CHANGE UP (ref 13–44)
LYMPHOCYTES # BLD AUTO: 16.2 % — SIGNIFICANT CHANGE UP (ref 13–44)
LYMPHOCYTES # BLD AUTO: 4.4 % — LOW (ref 13–44)
LYMPHOCYTES # BLD AUTO: 4.9 % — LOW (ref 13–44)
LYMPHOCYTES # BLD AUTO: 41.1 % — SIGNIFICANT CHANGE UP (ref 13–44)
LYMPHOCYTES # BLD AUTO: 5 % — LOW (ref 13–44)
LYMPHOCYTES # BLD AUTO: 5.87 K/UL — HIGH (ref 1–3.3)
M PNEUMO DNA SPEC QL NAA+PROBE: SIGNIFICANT CHANGE UP
MAGNESIUM SERPL-MCNC: 1.8 MG/DL — SIGNIFICANT CHANGE UP (ref 1.6–2.6)
MAGNESIUM SERPL-MCNC: 1.8 MG/DL — SIGNIFICANT CHANGE UP (ref 1.6–2.6)
MAGNESIUM SERPL-MCNC: 2 MG/DL — SIGNIFICANT CHANGE UP (ref 1.6–2.6)
MAGNESIUM SERPL-MCNC: 2 MG/DL — SIGNIFICANT CHANGE UP (ref 1.6–2.6)
MAGNESIUM SERPL-MCNC: 2.1 MG/DL — SIGNIFICANT CHANGE UP (ref 1.6–2.6)
MAGNESIUM SERPL-MCNC: 2.2 MG/DL — SIGNIFICANT CHANGE UP (ref 1.6–2.6)
MAGNESIUM SERPL-MCNC: 2.5 MG/DL — SIGNIFICANT CHANGE UP (ref 1.6–2.6)
MAGNESIUM SERPL-MCNC: 3.2 MG/DL — HIGH (ref 1.6–2.6)
MANUAL SMEAR VERIFICATION: SIGNIFICANT CHANGE UP
MCHC RBC-ENTMCNC: 29.6 PG — SIGNIFICANT CHANGE UP (ref 27–34)
MCHC RBC-ENTMCNC: 29.8 PG — SIGNIFICANT CHANGE UP (ref 27–34)
MCHC RBC-ENTMCNC: 30.1 PG — SIGNIFICANT CHANGE UP (ref 27–34)
MCHC RBC-ENTMCNC: 30.2 PG — SIGNIFICANT CHANGE UP (ref 27–34)
MCHC RBC-ENTMCNC: 30.3 PG — SIGNIFICANT CHANGE UP (ref 27–34)
MCHC RBC-ENTMCNC: 30.4 PG — SIGNIFICANT CHANGE UP (ref 27–34)
MCHC RBC-ENTMCNC: 30.4 PG — SIGNIFICANT CHANGE UP (ref 27–34)
MCHC RBC-ENTMCNC: 30.6 PG — SIGNIFICANT CHANGE UP (ref 27–34)
MCHC RBC-ENTMCNC: 31.8 GM/DL — LOW (ref 32–36)
MCHC RBC-ENTMCNC: 31.9 GM/DL — LOW (ref 32–36)
MCHC RBC-ENTMCNC: 32.2 GM/DL — SIGNIFICANT CHANGE UP (ref 32–36)
MCHC RBC-ENTMCNC: 32.4 GM/DL — SIGNIFICANT CHANGE UP (ref 32–36)
MCHC RBC-ENTMCNC: 32.7 GM/DL — SIGNIFICANT CHANGE UP (ref 32–36)
MCHC RBC-ENTMCNC: 32.7 GM/DL — SIGNIFICANT CHANGE UP (ref 32–36)
MCHC RBC-ENTMCNC: 32.8 GM/DL — SIGNIFICANT CHANGE UP (ref 32–36)
MCHC RBC-ENTMCNC: 33.1 GM/DL — SIGNIFICANT CHANGE UP (ref 32–36)
MCHC RBC-ENTMCNC: 33.3 GM/DL — SIGNIFICANT CHANGE UP (ref 32–36)
MCHC RBC-ENTMCNC: 34.4 GM/DL — SIGNIFICANT CHANGE UP (ref 32–36)
MCHC RBC-ENTMCNC: 34.9 GM/DL — SIGNIFICANT CHANGE UP (ref 32–36)
MCV RBC AUTO: 87.2 FL — SIGNIFICANT CHANGE UP (ref 80–100)
MCV RBC AUTO: 88.8 FL — SIGNIFICANT CHANGE UP (ref 80–100)
MCV RBC AUTO: 91.3 FL — SIGNIFICANT CHANGE UP (ref 80–100)
MCV RBC AUTO: 91.4 FL — SIGNIFICANT CHANGE UP (ref 80–100)
MCV RBC AUTO: 92.3 FL — SIGNIFICANT CHANGE UP (ref 80–100)
MCV RBC AUTO: 92.6 FL — SIGNIFICANT CHANGE UP (ref 80–100)
MCV RBC AUTO: 92.7 FL — SIGNIFICANT CHANGE UP (ref 80–100)
MCV RBC AUTO: 92.8 FL — SIGNIFICANT CHANGE UP (ref 80–100)
MCV RBC AUTO: 93.4 FL — SIGNIFICANT CHANGE UP (ref 80–100)
MCV RBC AUTO: 93.5 FL — SIGNIFICANT CHANGE UP (ref 80–100)
MCV RBC AUTO: 93.8 FL — SIGNIFICANT CHANGE UP (ref 80–100)
MCV RBC AUTO: 94.1 FL — SIGNIFICANT CHANGE UP (ref 80–100)
MCV RBC AUTO: 94.2 FL — SIGNIFICANT CHANGE UP (ref 80–100)
METAMYELOCYTES # FLD: 0.9 % — SIGNIFICANT CHANGE UP (ref 0–1)
METHADONE UR-MCNC: NEGATIVE — SIGNIFICANT CHANGE UP
MONOCYTES # BLD AUTO: 0.21 K/UL — SIGNIFICANT CHANGE UP (ref 0–0.9)
MONOCYTES # BLD AUTO: 0.32 K/UL — SIGNIFICANT CHANGE UP (ref 0–0.9)
MONOCYTES # BLD AUTO: 0.39 K/UL — SIGNIFICANT CHANGE UP (ref 0–0.9)
MONOCYTES # BLD AUTO: 0.47 K/UL — SIGNIFICANT CHANGE UP (ref 0–0.9)
MONOCYTES # BLD AUTO: 0.54 K/UL — SIGNIFICANT CHANGE UP (ref 0–0.9)
MONOCYTES # BLD AUTO: 0.57 K/UL — SIGNIFICANT CHANGE UP (ref 0–0.9)
MONOCYTES # BLD AUTO: 0.57 K/UL — SIGNIFICANT CHANGE UP (ref 0–0.9)
MONOCYTES # BLD AUTO: 0.67 K/UL — SIGNIFICANT CHANGE UP (ref 0–0.9)
MONOCYTES # BLD AUTO: 1.07 K/UL — HIGH (ref 0–0.9)
MONOCYTES # BLD AUTO: 1.07 K/UL — HIGH (ref 0–0.9)
MONOCYTES NFR BLD AUTO: 2.6 % — SIGNIFICANT CHANGE UP (ref 2–14)
MONOCYTES NFR BLD AUTO: 3.5 % — SIGNIFICANT CHANGE UP (ref 2–14)
MONOCYTES NFR BLD AUTO: 4.1 % — SIGNIFICANT CHANGE UP (ref 2–14)
MONOCYTES NFR BLD AUTO: 4.7 % — SIGNIFICANT CHANGE UP (ref 2–14)
MONOCYTES NFR BLD AUTO: 5.3 % — SIGNIFICANT CHANGE UP (ref 2–14)
MONOCYTES NFR BLD AUTO: 5.4 % — SIGNIFICANT CHANGE UP (ref 2–14)
MONOCYTES NFR BLD AUTO: 5.4 % — SIGNIFICANT CHANGE UP (ref 2–14)
MONOCYTES NFR BLD AUTO: 6.7 % — SIGNIFICANT CHANGE UP (ref 2–14)
MONOCYTES NFR BLD AUTO: 8.1 % — SIGNIFICANT CHANGE UP (ref 2–14)
MONOCYTES NFR BLD AUTO: 9 % — SIGNIFICANT CHANGE UP (ref 2–14)
MYELOCYTES NFR BLD: 0.9 % — HIGH (ref 0–0)
NEUTROPHILS # BLD AUTO: 10.11 K/UL — HIGH (ref 1.8–7.4)
NEUTROPHILS # BLD AUTO: 5.59 K/UL — SIGNIFICANT CHANGE UP (ref 1.8–7.4)
NEUTROPHILS # BLD AUTO: 6.54 K/UL — SIGNIFICANT CHANGE UP (ref 1.8–7.4)
NEUTROPHILS # BLD AUTO: 7.4 K/UL — SIGNIFICANT CHANGE UP (ref 1.8–7.4)
NEUTROPHILS # BLD AUTO: 7.89 K/UL — HIGH (ref 1.8–7.4)
NEUTROPHILS # BLD AUTO: 8.25 K/UL — HIGH (ref 1.8–7.4)
NEUTROPHILS # BLD AUTO: 8.3 K/UL — HIGH (ref 1.8–7.4)
NEUTROPHILS # BLD AUTO: 8.44 K/UL — HIGH (ref 1.8–7.4)
NEUTROPHILS # BLD AUTO: 8.47 K/UL — HIGH (ref 1.8–7.4)
NEUTROPHILS # BLD AUTO: 8.79 K/UL — HIGH (ref 1.8–7.4)
NEUTROPHILS NFR BLD AUTO: 44.9 % — SIGNIFICANT CHANGE UP (ref 43–77)
NEUTROPHILS NFR BLD AUTO: 73.7 % — SIGNIFICANT CHANGE UP (ref 43–77)
NEUTROPHILS NFR BLD AUTO: 76.1 % — SIGNIFICANT CHANGE UP (ref 43–77)
NEUTROPHILS NFR BLD AUTO: 78.7 % — HIGH (ref 43–77)
NEUTROPHILS NFR BLD AUTO: 78.7 % — HIGH (ref 43–77)
NEUTROPHILS NFR BLD AUTO: 78.8 % — HIGH (ref 43–77)
NEUTROPHILS NFR BLD AUTO: 80.3 % — HIGH (ref 43–77)
NEUTROPHILS NFR BLD AUTO: 90.2 % — HIGH (ref 43–77)
NEUTROPHILS NFR BLD AUTO: 91.1 % — HIGH (ref 43–77)
NEUTROPHILS NFR BLD AUTO: 91.6 % — HIGH (ref 43–77)
NEUTS BAND # BLD: 0.9 % — SIGNIFICANT CHANGE UP (ref 0–6)
NITRITE UR-MCNC: NEGATIVE — SIGNIFICANT CHANGE UP
NON HDL CHOLESTEROL: 178 MG/DL — HIGH
NRBC # BLD: 0 /100 WBCS — SIGNIFICANT CHANGE UP (ref 0–0)
NRBC # BLD: 1 /100 WBCS — HIGH (ref 0–0)
NRBC # BLD: 1 /100 WBCS — HIGH (ref 0–0)
OPIATES UR-MCNC: NEGATIVE — SIGNIFICANT CHANGE UP
OSMOLALITY SERPL: 315 MOSMOL/KG — HIGH (ref 275–300)
OSMOLALITY SERPL: 384 MOSMOL/KG — HIGH (ref 275–300)
OXYCODONE UR-MCNC: NEGATIVE — SIGNIFICANT CHANGE UP
PCO2 BLDA: 31 MMHG — LOW (ref 35–48)
PCO2 BLDV: 39 MMHG — LOW (ref 42–55)
PCO2 BLDV: 64 MMHG — HIGH (ref 42–55)
PCP SPEC-MCNC: SIGNIFICANT CHANGE UP
PCP UR-MCNC: NEGATIVE — SIGNIFICANT CHANGE UP
PH BLDA: 7.34 — LOW (ref 7.35–7.45)
PH BLDV: 7.19 — CRITICAL LOW (ref 7.32–7.43)
PH BLDV: 7.21 — LOW (ref 7.32–7.43)
PH UR: 6 — SIGNIFICANT CHANGE UP (ref 5–8)
PH UR: 7 — SIGNIFICANT CHANGE UP (ref 5–8)
PH UR: 7.5 — SIGNIFICANT CHANGE UP (ref 5–8)
PHOSPHATE SERPL-MCNC: 0.5 MG/DL — CRITICAL LOW (ref 2.5–4.5)
PHOSPHATE SERPL-MCNC: 0.8 MG/DL — CRITICAL LOW (ref 2.5–4.5)
PHOSPHATE SERPL-MCNC: 1.2 MG/DL — LOW (ref 2.5–4.5)
PHOSPHATE SERPL-MCNC: 2.5 MG/DL — SIGNIFICANT CHANGE UP (ref 2.5–4.5)
PHOSPHATE SERPL-MCNC: 2.7 MG/DL — SIGNIFICANT CHANGE UP (ref 2.5–4.5)
PHOSPHATE SERPL-MCNC: 4.2 MG/DL — SIGNIFICANT CHANGE UP (ref 2.5–4.5)
PHOSPHATE SERPL-MCNC: 4.4 MG/DL — SIGNIFICANT CHANGE UP (ref 2.5–4.5)
PHOSPHATE SERPL-MCNC: 4.6 MG/DL — HIGH (ref 2.5–4.5)
PHOSPHATE SERPL-MCNC: 5.3 MG/DL — HIGH (ref 2.5–4.5)
PHOSPHATE SERPL-MCNC: 5.7 MG/DL — HIGH (ref 2.5–4.5)
PLAT MORPH BLD: NORMAL — SIGNIFICANT CHANGE UP
PLATELET # BLD AUTO: 101 K/UL — LOW (ref 150–400)
PLATELET # BLD AUTO: 105 K/UL — LOW (ref 150–400)
PLATELET # BLD AUTO: 108 K/UL — LOW (ref 150–400)
PLATELET # BLD AUTO: 116 K/UL — LOW (ref 150–400)
PLATELET # BLD AUTO: 117 K/UL — LOW (ref 150–400)
PLATELET # BLD AUTO: 122 K/UL — LOW (ref 150–400)
PLATELET # BLD AUTO: 123 K/UL — LOW (ref 150–400)
PLATELET # BLD AUTO: 207 K/UL — SIGNIFICANT CHANGE UP (ref 150–400)
PLATELET # BLD AUTO: 219 K/UL — SIGNIFICANT CHANGE UP (ref 150–400)
PLATELET # BLD AUTO: 80 K/UL — LOW (ref 150–400)
PLATELET # BLD AUTO: 81 K/UL — LOW (ref 150–400)
PLATELET # BLD AUTO: 87 K/UL — LOW (ref 150–400)
PLATELET # BLD AUTO: 94 K/UL — LOW (ref 150–400)
PLATELET COUNT - ESTIMATE: NORMAL — SIGNIFICANT CHANGE UP
PO2 BLDA: 121 MMHG — HIGH (ref 83–108)
PO2 BLDV: 42 MMHG — SIGNIFICANT CHANGE UP
PO2 BLDV: 55 MMHG — HIGH (ref 25–45)
POIKILOCYTOSIS BLD QL AUTO: SIGNIFICANT CHANGE UP
POTASSIUM BLDV-SCNC: 3.2 MMOL/L — LOW (ref 3.5–5.1)
POTASSIUM SERPL-MCNC: 2.1 MMOL/L — CRITICAL LOW (ref 3.5–5.3)
POTASSIUM SERPL-MCNC: 2.3 MMOL/L — CRITICAL LOW (ref 3.5–5.3)
POTASSIUM SERPL-MCNC: 2.3 MMOL/L — CRITICAL LOW (ref 3.5–5.3)
POTASSIUM SERPL-MCNC: 3 MMOL/L — LOW (ref 3.5–5.3)
POTASSIUM SERPL-MCNC: 3.5 MMOL/L — SIGNIFICANT CHANGE UP (ref 3.5–5.3)
POTASSIUM SERPL-MCNC: 3.5 MMOL/L — SIGNIFICANT CHANGE UP (ref 3.5–5.3)
POTASSIUM SERPL-MCNC: 3.6 MMOL/L — SIGNIFICANT CHANGE UP (ref 3.5–5.3)
POTASSIUM SERPL-MCNC: 3.6 MMOL/L — SIGNIFICANT CHANGE UP (ref 3.5–5.3)
POTASSIUM SERPL-MCNC: 3.7 MMOL/L — SIGNIFICANT CHANGE UP (ref 3.5–5.3)
POTASSIUM SERPL-MCNC: 3.9 MMOL/L — SIGNIFICANT CHANGE UP (ref 3.5–5.3)
POTASSIUM SERPL-MCNC: 3.9 MMOL/L — SIGNIFICANT CHANGE UP (ref 3.5–5.3)
POTASSIUM SERPL-MCNC: 4.2 MMOL/L — SIGNIFICANT CHANGE UP (ref 3.5–5.3)
POTASSIUM SERPL-MCNC: 4.8 MMOL/L — SIGNIFICANT CHANGE UP (ref 3.5–5.3)
POTASSIUM SERPL-MCNC: 4.8 MMOL/L — SIGNIFICANT CHANGE UP (ref 3.5–5.3)
POTASSIUM SERPL-MCNC: 5 MMOL/L — SIGNIFICANT CHANGE UP (ref 3.5–5.3)
POTASSIUM SERPL-MCNC: 5.3 MMOL/L — SIGNIFICANT CHANGE UP (ref 3.5–5.3)
POTASSIUM SERPL-MCNC: 5.4 MMOL/L — HIGH (ref 3.5–5.3)
POTASSIUM SERPL-SCNC: 2.1 MMOL/L — CRITICAL LOW (ref 3.5–5.3)
POTASSIUM SERPL-SCNC: 2.3 MMOL/L — CRITICAL LOW (ref 3.5–5.3)
POTASSIUM SERPL-SCNC: 2.3 MMOL/L — CRITICAL LOW (ref 3.5–5.3)
POTASSIUM SERPL-SCNC: 3 MMOL/L — LOW (ref 3.5–5.3)
POTASSIUM SERPL-SCNC: 3.5 MMOL/L — SIGNIFICANT CHANGE UP (ref 3.5–5.3)
POTASSIUM SERPL-SCNC: 3.5 MMOL/L — SIGNIFICANT CHANGE UP (ref 3.5–5.3)
POTASSIUM SERPL-SCNC: 3.6 MMOL/L — SIGNIFICANT CHANGE UP (ref 3.5–5.3)
POTASSIUM SERPL-SCNC: 3.6 MMOL/L — SIGNIFICANT CHANGE UP (ref 3.5–5.3)
POTASSIUM SERPL-SCNC: 3.7 MMOL/L — SIGNIFICANT CHANGE UP (ref 3.5–5.3)
POTASSIUM SERPL-SCNC: 3.9 MMOL/L — SIGNIFICANT CHANGE UP (ref 3.5–5.3)
POTASSIUM SERPL-SCNC: 3.9 MMOL/L — SIGNIFICANT CHANGE UP (ref 3.5–5.3)
POTASSIUM SERPL-SCNC: 4.2 MMOL/L — SIGNIFICANT CHANGE UP (ref 3.5–5.3)
POTASSIUM SERPL-SCNC: 4.8 MMOL/L — SIGNIFICANT CHANGE UP (ref 3.5–5.3)
POTASSIUM SERPL-SCNC: 4.8 MMOL/L — SIGNIFICANT CHANGE UP (ref 3.5–5.3)
POTASSIUM SERPL-SCNC: 5 MMOL/L — SIGNIFICANT CHANGE UP (ref 3.5–5.3)
POTASSIUM SERPL-SCNC: 5.3 MMOL/L — SIGNIFICANT CHANGE UP (ref 3.5–5.3)
POTASSIUM SERPL-SCNC: 5.4 MMOL/L — HIGH (ref 3.5–5.3)
PROT SERPL-MCNC: 4.9 G/DL — LOW (ref 6–8.3)
PROT SERPL-MCNC: 5 G/DL — LOW (ref 6–8.3)
PROT SERPL-MCNC: 5.2 G/DL — LOW (ref 6–8.3)
PROT SERPL-MCNC: 5.2 G/DL — LOW (ref 6–8.3)
PROT SERPL-MCNC: 5.3 G/DL — LOW (ref 6–8.3)
PROT SERPL-MCNC: 5.3 G/DL — LOW (ref 6–8.3)
PROT SERPL-MCNC: 5.5 G/DL — LOW (ref 6–8.3)
PROT SERPL-MCNC: 5.6 G/DL — LOW (ref 6–8.3)
PROT SERPL-MCNC: 6 G/DL — SIGNIFICANT CHANGE UP (ref 6–8.3)
PROT SERPL-MCNC: 6.1 G/DL — SIGNIFICANT CHANGE UP (ref 6–8.3)
PROT SERPL-MCNC: 6.2 G/DL — SIGNIFICANT CHANGE UP (ref 6–8.3)
PROT SERPL-MCNC: 6.3 G/DL — SIGNIFICANT CHANGE UP (ref 6–8.3)
PROT SERPL-MCNC: 7 G/DL — SIGNIFICANT CHANGE UP (ref 6–8.3)
PROT UR-MCNC: ABNORMAL
PROT UR-MCNC: ABNORMAL
PROT UR-MCNC: NEGATIVE — SIGNIFICANT CHANGE UP
PROTHROM AB SERPL-ACNC: 12.4 SEC — SIGNIFICANT CHANGE UP (ref 10.5–13.4)
PROTHROM AB SERPL-ACNC: 14 SEC — HIGH (ref 10.5–13.4)
PROTHROM AB SERPL-ACNC: 14.3 SEC — HIGH (ref 10.5–13.4)
PROTHROM AB SERPL-ACNC: 14.4 SEC — HIGH (ref 10.5–13.4)
PROTHROM AB SERPL-ACNC: 14.6 SEC — HIGH (ref 10.5–13.4)
PROTHROM AB SERPL-ACNC: 14.7 SEC — HIGH (ref 10.5–13.4)
PROTHROM AB SERPL-ACNC: 14.7 SEC — HIGH (ref 10.5–13.4)
PROTHROM AB SERPL-ACNC: 15.1 SEC — HIGH (ref 10.5–13.4)
PROTHROM AB SERPL-ACNC: 15.2 SEC — HIGH (ref 10.5–13.4)
PROTHROM AB SERPL-ACNC: 15.3 SEC — HIGH (ref 10.5–13.4)
PROTHROM AB SERPL-ACNC: 15.5 SEC — HIGH (ref 10.5–13.4)
PROTHROM AB SERPL-ACNC: 15.6 SEC — HIGH (ref 10.5–13.4)
RAPID RVP RESULT: DETECTED
RBC # BLD: 2.84 M/UL — LOW (ref 4.2–5.8)
RBC # BLD: 2.97 M/UL — LOW (ref 4.2–5.8)
RBC # BLD: 3.01 M/UL — LOW (ref 4.2–5.8)
RBC # BLD: 3.17 M/UL — LOW (ref 4.2–5.8)
RBC # BLD: 3.22 M/UL — LOW (ref 4.2–5.8)
RBC # BLD: 3.3 M/UL — LOW (ref 4.2–5.8)
RBC # BLD: 3.45 M/UL — LOW (ref 4.2–5.8)
RBC # BLD: 3.49 M/UL — LOW (ref 4.2–5.8)
RBC # BLD: 3.53 M/UL — LOW (ref 4.2–5.8)
RBC # BLD: 3.69 M/UL — LOW (ref 4.2–5.8)
RBC # BLD: 3.93 M/UL — LOW (ref 4.2–5.8)
RBC # BLD: 5.27 M/UL — SIGNIFICANT CHANGE UP (ref 4.2–5.8)
RBC # BLD: 5.39 M/UL — SIGNIFICANT CHANGE UP (ref 4.2–5.8)
RBC # FLD: 12.3 % — SIGNIFICANT CHANGE UP (ref 10.3–14.5)
RBC # FLD: 12.5 % — SIGNIFICANT CHANGE UP (ref 10.3–14.5)
RBC # FLD: 13.5 % — SIGNIFICANT CHANGE UP (ref 10.3–14.5)
RBC # FLD: 13.6 % — SIGNIFICANT CHANGE UP (ref 10.3–14.5)
RBC # FLD: 13.8 % — SIGNIFICANT CHANGE UP (ref 10.3–14.5)
RBC # FLD: 14.1 % — SIGNIFICANT CHANGE UP (ref 10.3–14.5)
RBC # FLD: 14.5 % — SIGNIFICANT CHANGE UP (ref 10.3–14.5)
RBC # FLD: 14.6 % — HIGH (ref 10.3–14.5)
RBC # FLD: 14.7 % — HIGH (ref 10.3–14.5)
RBC # FLD: 14.9 % — HIGH (ref 10.3–14.5)
RBC # FLD: 15.5 % — HIGH (ref 10.3–14.5)
RBC # FLD: 15.5 % — HIGH (ref 10.3–14.5)
RBC # FLD: 15.8 % — HIGH (ref 10.3–14.5)
RBC BLD AUTO: ABNORMAL
RBC CASTS # UR COMP ASSIST: 2 /HPF — SIGNIFICANT CHANGE UP (ref 0–4)
RBC CASTS # UR COMP ASSIST: 2 /HPF — SIGNIFICANT CHANGE UP (ref 0–4)
RH IG SCN BLD-IMP: POSITIVE — SIGNIFICANT CHANGE UP
RH IG SCN BLD-IMP: POSITIVE — SIGNIFICANT CHANGE UP
RSV RNA SPEC QL NAA+PROBE: SIGNIFICANT CHANGE UP
RV+EV RNA SPEC QL NAA+PROBE: SIGNIFICANT CHANGE UP
SALICYLATES SERPL-MCNC: <0.3 MG/DL — LOW (ref 15–30)
SAO2 % BLDA: 98.8 % — HIGH (ref 94–98)
SAO2 % BLDV: 66.5 % — SIGNIFICANT CHANGE UP
SAO2 % BLDV: 84.9 % — SIGNIFICANT CHANGE UP (ref 67–88)
SARS-COV-2 RNA SPEC QL NAA+PROBE: SIGNIFICANT CHANGE UP
SMUDGE CELLS # BLD: PRESENT — SIGNIFICANT CHANGE UP
SODIUM SERPL-SCNC: 135 MMOL/L — SIGNIFICANT CHANGE UP (ref 135–145)
SODIUM SERPL-SCNC: 137 MMOL/L — SIGNIFICANT CHANGE UP (ref 135–145)
SODIUM SERPL-SCNC: 138 MMOL/L — SIGNIFICANT CHANGE UP (ref 135–145)
SODIUM SERPL-SCNC: 151 MMOL/L — HIGH (ref 135–145)
SODIUM SERPL-SCNC: 153 MMOL/L — HIGH (ref 135–145)
SODIUM SERPL-SCNC: 154 MMOL/L — HIGH (ref 135–145)
SODIUM SERPL-SCNC: 155 MMOL/L — HIGH (ref 135–145)
SODIUM SERPL-SCNC: 155 MMOL/L — HIGH (ref 135–145)
SODIUM SERPL-SCNC: 158 MMOL/L — HIGH (ref 135–145)
SODIUM SERPL-SCNC: 164 MMOL/L — CRITICAL HIGH (ref 135–145)
SODIUM SERPL-SCNC: >170 MMOL/L — CRITICAL HIGH (ref 135–145)
SP GR SPEC: 1 — LOW (ref 1.01–1.02)
SP GR SPEC: 1.03 — HIGH (ref 1.01–1.02)
SP GR SPEC: 1.03 — HIGH (ref 1.01–1.02)
SP GR UR STRIP: 1.01 — LOW (ref 1.01–1.02)
SPHEROCYTES BLD QL SMEAR: SLIGHT — SIGNIFICANT CHANGE UP
THC UR QL: NEGATIVE — SIGNIFICANT CHANGE UP
TOXICOLOGY SCREEN, DRUGS OF ABUSE, SERUM RESULT: SIGNIFICANT CHANGE UP
TRIGL SERPL-MCNC: 262 MG/DL — HIGH
TROPONIN T, HIGH SENSITIVITY RESULT: 119 NG/L — HIGH (ref 0–51)
TROPONIN T, HIGH SENSITIVITY RESULT: 140 NG/L — HIGH (ref 0–51)
TROPONIN T, HIGH SENSITIVITY RESULT: 169 NG/L — HIGH (ref 0–51)
TROPONIN T, HIGH SENSITIVITY RESULT: 194 NG/L — HIGH (ref 0–51)
TROPONIN T, HIGH SENSITIVITY RESULT: 197 NG/L — HIGH (ref 0–51)
TROPONIN T, HIGH SENSITIVITY RESULT: 408 NG/L — HIGH (ref 0–51)
TROPONIN T, HIGH SENSITIVITY RESULT: 507 NG/L — HIGH (ref 0–51)
TROPONIN T, HIGH SENSITIVITY RESULT: 59 NG/L — HIGH (ref 0–51)
TROPONIN T, HIGH SENSITIVITY RESULT: 68 NG/L — HIGH (ref 0–51)
TROPONIN T, HIGH SENSITIVITY RESULT: 76 NG/L — HIGH (ref 0–51)
TROPONIN T, HIGH SENSITIVITY RESULT: 87 NG/L — HIGH (ref 0–51)
TSH SERPL-MCNC: 0.59 UIU/ML — SIGNIFICANT CHANGE UP (ref 0.27–4.2)
UROBILINOGEN FLD QL: ABNORMAL
UROBILINOGEN FLD QL: NEGATIVE — SIGNIFICANT CHANGE UP
UROBILINOGEN FLD QL: NEGATIVE — SIGNIFICANT CHANGE UP
VARIANT LYMPHS # BLD: 3.8 % — SIGNIFICANT CHANGE UP (ref 0–6)
WBC # BLD: 10.02 K/UL — SIGNIFICANT CHANGE UP (ref 3.8–10.5)
WBC # BLD: 10.53 K/UL — HIGH (ref 3.8–10.5)
WBC # BLD: 10.73 K/UL — HIGH (ref 3.8–10.5)
WBC # BLD: 11.91 K/UL — HIGH (ref 3.8–10.5)
WBC # BLD: 13.27 K/UL — HIGH (ref 3.8–10.5)
WBC # BLD: 14.29 K/UL — HIGH (ref 3.8–10.5)
WBC # BLD: 25.85 K/UL — HIGH (ref 3.8–10.5)
WBC # BLD: 6.18 K/UL — SIGNIFICANT CHANGE UP (ref 3.8–10.5)
WBC # BLD: 6.97 K/UL — SIGNIFICANT CHANGE UP (ref 3.8–10.5)
WBC # BLD: 8.08 K/UL — SIGNIFICANT CHANGE UP (ref 3.8–10.5)
WBC # BLD: 8.48 K/UL — SIGNIFICANT CHANGE UP (ref 3.8–10.5)
WBC # BLD: 9.06 K/UL — SIGNIFICANT CHANGE UP (ref 3.8–10.5)
WBC # BLD: 9.4 K/UL — SIGNIFICANT CHANGE UP (ref 3.8–10.5)
WBC # FLD AUTO: 10.02 K/UL — SIGNIFICANT CHANGE UP (ref 3.8–10.5)
WBC # FLD AUTO: 10.53 K/UL — HIGH (ref 3.8–10.5)
WBC # FLD AUTO: 10.73 K/UL — HIGH (ref 3.8–10.5)
WBC # FLD AUTO: 11.91 K/UL — HIGH (ref 3.8–10.5)
WBC # FLD AUTO: 13.27 K/UL — HIGH (ref 3.8–10.5)
WBC # FLD AUTO: 14.29 K/UL — HIGH (ref 3.8–10.5)
WBC # FLD AUTO: 25.85 K/UL — HIGH (ref 3.8–10.5)
WBC # FLD AUTO: 6.18 K/UL — SIGNIFICANT CHANGE UP (ref 3.8–10.5)
WBC # FLD AUTO: 6.97 K/UL — SIGNIFICANT CHANGE UP (ref 3.8–10.5)
WBC # FLD AUTO: 8.08 K/UL — SIGNIFICANT CHANGE UP (ref 3.8–10.5)
WBC # FLD AUTO: 8.48 K/UL — SIGNIFICANT CHANGE UP (ref 3.8–10.5)
WBC # FLD AUTO: 9.06 K/UL — SIGNIFICANT CHANGE UP (ref 3.8–10.5)
WBC # FLD AUTO: 9.4 K/UL — SIGNIFICANT CHANGE UP (ref 3.8–10.5)
WBC UR QL: 1 /HPF — SIGNIFICANT CHANGE UP (ref 0–5)
WBC UR QL: 14 /HPF — HIGH (ref 0–5)

## 2022-01-01 PROCEDURE — 76377 3D RENDER W/INTRP POSTPROCES: CPT | Mod: 26

## 2022-01-01 PROCEDURE — 43753 TX GASTRO INTUB W/ASP: CPT

## 2022-01-01 PROCEDURE — 36620 INSERTION CATHETER ARTERY: CPT

## 2022-01-01 PROCEDURE — 71045 X-RAY EXAM CHEST 1 VIEW: CPT | Mod: 26,76

## 2022-01-01 PROCEDURE — 93970 EXTREMITY STUDY: CPT | Mod: 26

## 2022-01-01 PROCEDURE — 71045 X-RAY EXAM CHEST 1 VIEW: CPT | Mod: 26

## 2022-01-01 PROCEDURE — 99291 CRITICAL CARE FIRST HOUR: CPT

## 2022-01-01 PROCEDURE — 99255 IP/OBS CONSLTJ NEW/EST HI 80: CPT | Mod: 25

## 2022-01-01 PROCEDURE — 93010 ELECTROCARDIOGRAM REPORT: CPT | Mod: 76

## 2022-01-01 PROCEDURE — 99284 EMERGENCY DEPT VISIT MOD MDM: CPT | Mod: 25

## 2022-01-01 PROCEDURE — 74176 CT ABD & PELVIS W/O CONTRAST: CPT | Mod: 26

## 2022-01-01 PROCEDURE — 36556 INSERT NON-TUNNEL CV CATH: CPT

## 2022-01-01 PROCEDURE — 70450 CT HEAD/BRAIN W/O DYE: CPT | Mod: 26

## 2022-01-01 PROCEDURE — 93010 ELECTROCARDIOGRAM REPORT: CPT | Mod: 59

## 2022-01-01 PROCEDURE — 70496 CT ANGIOGRAPHY HEAD: CPT | Mod: 26,MA

## 2022-01-01 PROCEDURE — 75898 FOLLOW-UP ANGIOGRAPHY: CPT | Mod: 26

## 2022-01-01 PROCEDURE — 93306 TTE W/DOPPLER COMPLETE: CPT | Mod: 26

## 2022-01-01 PROCEDURE — 31624 DX BRONCHOSCOPE/LAVAGE: CPT | Mod: GC

## 2022-01-01 PROCEDURE — 36227 PLACE CATH XTRNL CAROTID: CPT

## 2022-01-01 PROCEDURE — 75894 X-RAYS TRANSCATH THERAPY: CPT | Mod: 26

## 2022-01-01 PROCEDURE — 71250 CT THORAX DX C-: CPT | Mod: 26

## 2022-01-01 PROCEDURE — 70450 CT HEAD/BRAIN W/O DYE: CPT | Mod: 26,MA,77

## 2022-01-01 PROCEDURE — 61624 TCAT PERM OCCLS/EMBOLJ CNS: CPT

## 2022-01-01 PROCEDURE — 72125 CT NECK SPINE W/O DYE: CPT | Mod: 26,MA

## 2022-01-01 PROCEDURE — 36224 PLACE CATH CAROTD ART: CPT | Mod: 50

## 2022-01-01 PROCEDURE — 99291 CRITICAL CARE FIRST HOUR: CPT | Mod: 25

## 2022-01-01 PROCEDURE — 93456 R HRT CORONARY ARTERY ANGIO: CPT | Mod: 26

## 2022-01-01 PROCEDURE — 61107 TDH PNXR IMPLT VENTR CATH: CPT

## 2022-01-01 PROCEDURE — 76700 US EXAM ABDOM COMPLETE: CPT | Mod: 26

## 2022-01-01 PROCEDURE — 47000 NEEDLE BIOPSY OF LIVER PERQ: CPT

## 2022-01-01 PROCEDURE — 70498 CT ANGIOGRAPHY NECK: CPT | Mod: 26,MA

## 2022-01-01 PROCEDURE — 36226 PLACE CATH VERTEBRAL ART: CPT | Mod: 50

## 2022-01-01 PROCEDURE — 76942 ECHO GUIDE FOR BIOPSY: CPT | Mod: 26

## 2022-01-01 PROCEDURE — 71045 X-RAY EXAM CHEST 1 VIEW: CPT | Mod: 26,77,76

## 2022-01-01 PROCEDURE — 74018 RADEX ABDOMEN 1 VIEW: CPT | Mod: 26

## 2022-01-01 RX ORDER — POTASSIUM CHLORIDE 20 MEQ
40 PACKET (EA) ORAL
Refills: 0 | Status: COMPLETED | OUTPATIENT
Start: 2022-01-01 | End: 2022-01-01

## 2022-01-01 RX ORDER — SODIUM BICARBONATE 1 MEQ/ML
50 SYRINGE (ML) INTRAVENOUS ONCE
Refills: 0 | Status: COMPLETED | OUTPATIENT
Start: 2022-01-01 | End: 2022-01-01

## 2022-01-01 RX ORDER — PHENYLEPHRINE HYDROCHLORIDE 10 MG/ML
1.4 INJECTION INTRAVENOUS
Qty: 40 | Refills: 0 | Status: DISCONTINUED | OUTPATIENT
Start: 2022-01-01 | End: 2022-01-01

## 2022-01-01 RX ORDER — SODIUM CHLORIDE 9 MG/ML
1000 INJECTION INTRAMUSCULAR; INTRAVENOUS; SUBCUTANEOUS ONCE
Refills: 0 | Status: COMPLETED | OUTPATIENT
Start: 2022-01-01 | End: 2022-01-01

## 2022-01-01 RX ORDER — PHENYLEPHRINE HYDROCHLORIDE 10 MG/ML
0.1 INJECTION INTRAVENOUS
Qty: 160 | Refills: 0 | Status: DISCONTINUED | OUTPATIENT
Start: 2022-01-01 | End: 2022-01-01

## 2022-01-01 RX ORDER — HYDROMORPHONE HYDROCHLORIDE 2 MG/ML
0.5 INJECTION INTRAMUSCULAR; INTRAVENOUS; SUBCUTANEOUS ONCE
Refills: 0 | Status: DISCONTINUED | OUTPATIENT
Start: 2022-01-01 | End: 2022-01-01

## 2022-01-01 RX ORDER — ACETAMINOPHEN 500 MG
650 TABLET ORAL EVERY 6 HOURS
Refills: 0 | Status: DISCONTINUED | OUTPATIENT
Start: 2022-01-01 | End: 2022-01-01

## 2022-01-01 RX ORDER — ONDANSETRON 8 MG/1
4 TABLET, FILM COATED ORAL EVERY 6 HOURS
Refills: 0 | Status: DISCONTINUED | OUTPATIENT
Start: 2022-01-01 | End: 2022-01-01

## 2022-01-01 RX ORDER — MANNITOL
38 POWDER (GRAM) MISCELLANEOUS ONCE
Refills: 0 | Status: COMPLETED | OUTPATIENT
Start: 2022-01-01 | End: 2022-01-01

## 2022-01-01 RX ORDER — SODIUM CHLORIDE 9 MG/ML
1000 INJECTION INTRAMUSCULAR; INTRAVENOUS; SUBCUTANEOUS
Refills: 0 | Status: DISCONTINUED | OUTPATIENT
Start: 2022-01-01 | End: 2022-01-01

## 2022-01-01 RX ORDER — POTASSIUM PHOSPHATE, MONOBASIC POTASSIUM PHOSPHATE, DIBASIC 236; 224 MG/ML; MG/ML
15 INJECTION, SOLUTION INTRAVENOUS ONCE
Refills: 0 | Status: COMPLETED | OUTPATIENT
Start: 2022-01-01 | End: 2022-01-01

## 2022-01-01 RX ORDER — PIPERACILLIN AND TAZOBACTAM 4; .5 G/20ML; G/20ML
3.38 INJECTION, POWDER, LYOPHILIZED, FOR SOLUTION INTRAVENOUS ONCE
Refills: 0 | Status: COMPLETED | OUTPATIENT
Start: 2022-01-01 | End: 2022-01-01

## 2022-01-01 RX ORDER — PIPERACILLIN AND TAZOBACTAM 4; .5 G/20ML; G/20ML
3.38 INJECTION, POWDER, LYOPHILIZED, FOR SOLUTION INTRAVENOUS EVERY 8 HOURS
Refills: 0 | Status: DISCONTINUED | OUTPATIENT
Start: 2022-01-01 | End: 2022-01-01

## 2022-01-01 RX ORDER — VASOPRESSIN 20 [USP'U]/ML
0.04 INJECTION INTRAVENOUS
Qty: 50 | Refills: 0 | Status: DISCONTINUED | OUTPATIENT
Start: 2022-01-01 | End: 2022-01-01

## 2022-01-01 RX ORDER — SODIUM CHLORIDE 9 MG/ML
1000 INJECTION, SOLUTION INTRAVENOUS ONCE
Refills: 0 | Status: COMPLETED | OUTPATIENT
Start: 2022-01-01 | End: 2022-01-01

## 2022-01-01 RX ORDER — MULTIVIT-MIN/FERROUS GLUCONATE 9 MG/15 ML
15 LIQUID (ML) ORAL DAILY
Refills: 0 | Status: DISCONTINUED | OUTPATIENT
Start: 2022-01-01 | End: 2022-01-01

## 2022-01-01 RX ORDER — DEXMEDETOMIDINE HYDROCHLORIDE IN 0.9% SODIUM CHLORIDE 4 UG/ML
0.11 INJECTION INTRAVENOUS
Qty: 200 | Refills: 0 | Status: DISCONTINUED | OUTPATIENT
Start: 2022-01-01 | End: 2022-01-01

## 2022-01-01 RX ORDER — HYDROMORPHONE HYDROCHLORIDE 2 MG/ML
0.5 INJECTION INTRAMUSCULAR; INTRAVENOUS; SUBCUTANEOUS
Refills: 0 | Status: DISCONTINUED | OUTPATIENT
Start: 2022-01-01 | End: 2022-01-01

## 2022-01-01 RX ORDER — POTASSIUM CHLORIDE 20 MEQ
40 PACKET (EA) ORAL ONCE
Refills: 0 | Status: DISCONTINUED | OUTPATIENT
Start: 2022-01-01 | End: 2022-01-01

## 2022-01-01 RX ORDER — POTASSIUM CHLORIDE 20 MEQ
20 PACKET (EA) ORAL
Refills: 0 | Status: COMPLETED | OUTPATIENT
Start: 2022-01-01 | End: 2022-01-01

## 2022-01-01 RX ORDER — SENNA PLUS 8.6 MG/1
2 TABLET ORAL AT BEDTIME
Refills: 0 | Status: DISCONTINUED | OUTPATIENT
Start: 2022-01-01 | End: 2022-01-01

## 2022-01-01 RX ORDER — NICARDIPINE HYDROCHLORIDE 30 MG/1
5 CAPSULE, EXTENDED RELEASE ORAL
Qty: 40 | Refills: 0 | Status: DISCONTINUED | OUTPATIENT
Start: 2022-01-01 | End: 2022-01-01

## 2022-01-01 RX ORDER — CHLORHEXIDINE GLUCONATE 213 G/1000ML
1 SOLUTION TOPICAL
Refills: 0 | Status: DISCONTINUED | OUTPATIENT
Start: 2022-01-01 | End: 2022-01-01

## 2022-01-01 RX ORDER — HEPARIN SODIUM 5000 [USP'U]/ML
5000 INJECTION INTRAVENOUS; SUBCUTANEOUS EVERY 8 HOURS
Refills: 0 | Status: DISCONTINUED | OUTPATIENT
Start: 2022-01-01 | End: 2022-01-01

## 2022-01-01 RX ORDER — MANNITOL
75 POWDER (GRAM) MISCELLANEOUS ONCE
Refills: 0 | Status: COMPLETED | OUTPATIENT
Start: 2022-01-01 | End: 2022-01-01

## 2022-01-01 RX ORDER — FOLIC ACID 0.8 MG
1 TABLET ORAL DAILY
Refills: 0 | Status: DISCONTINUED | OUTPATIENT
Start: 2022-01-01 | End: 2022-01-01

## 2022-01-01 RX ORDER — IPRATROPIUM/ALBUTEROL SULFATE 18-103MCG
3 AEROSOL WITH ADAPTER (GRAM) INHALATION ONCE
Refills: 0 | Status: COMPLETED | OUTPATIENT
Start: 2022-01-01 | End: 2022-01-01

## 2022-01-01 RX ORDER — INSULIN LISPRO 100/ML
VIAL (ML) SUBCUTANEOUS EVERY 6 HOURS
Refills: 0 | Status: DISCONTINUED | OUTPATIENT
Start: 2022-01-01 | End: 2022-01-01

## 2022-01-01 RX ORDER — MANNITOL
74 POWDER (GRAM) MISCELLANEOUS ONCE
Refills: 0 | Status: DISCONTINUED | OUTPATIENT
Start: 2022-01-01 | End: 2022-01-01

## 2022-01-01 RX ORDER — OXYCODONE HYDROCHLORIDE 5 MG/1
10 TABLET ORAL EVERY 6 HOURS
Refills: 0 | Status: DISCONTINUED | OUTPATIENT
Start: 2022-01-01 | End: 2022-01-01

## 2022-01-01 RX ORDER — SODIUM CHLORIDE 9 MG/ML
30 INJECTION INTRAMUSCULAR; INTRAVENOUS; SUBCUTANEOUS ONCE
Refills: 0 | Status: DISCONTINUED | OUTPATIENT
Start: 2022-01-01 | End: 2022-01-01

## 2022-01-01 RX ORDER — ALBUMIN HUMAN 25 %
50 VIAL (ML) INTRAVENOUS ONCE
Refills: 0 | Status: COMPLETED | OUTPATIENT
Start: 2022-01-01 | End: 2022-01-01

## 2022-01-01 RX ORDER — MIDAZOLAM HYDROCHLORIDE 1 MG/ML
4 INJECTION, SOLUTION INTRAMUSCULAR; INTRAVENOUS ONCE
Refills: 0 | Status: DISCONTINUED | OUTPATIENT
Start: 2022-01-01 | End: 2022-01-01

## 2022-01-01 RX ORDER — SODIUM CHLORIDE 9 MG/ML
10 INJECTION INTRAMUSCULAR; INTRAVENOUS; SUBCUTANEOUS
Refills: 0 | Status: DISCONTINUED | OUTPATIENT
Start: 2022-01-01 | End: 2022-01-01

## 2022-01-01 RX ORDER — MIDAZOLAM HYDROCHLORIDE 1 MG/ML
2 INJECTION, SOLUTION INTRAMUSCULAR; INTRAVENOUS ONCE
Refills: 0 | Status: DISCONTINUED | OUTPATIENT
Start: 2022-01-01 | End: 2022-01-01

## 2022-01-01 RX ORDER — FUROSEMIDE 40 MG
20 TABLET ORAL ONCE
Refills: 0 | Status: COMPLETED | OUTPATIENT
Start: 2022-01-01 | End: 2022-01-01

## 2022-01-01 RX ORDER — SODIUM CHLORIDE 9 MG/ML
500 INJECTION INTRAMUSCULAR; INTRAVENOUS; SUBCUTANEOUS ONCE
Refills: 0 | Status: COMPLETED | OUTPATIENT
Start: 2022-01-01 | End: 2022-01-01

## 2022-01-01 RX ORDER — VANCOMYCIN HCL 1 G
1000 VIAL (EA) INTRAVENOUS EVERY 12 HOURS
Refills: 0 | Status: DISCONTINUED | OUTPATIENT
Start: 2022-01-01 | End: 2022-01-01

## 2022-01-01 RX ORDER — DEXTROSE MONOHYDRATE, SODIUM CHLORIDE, AND POTASSIUM CHLORIDE 50; .745; 4.5 G/1000ML; G/1000ML; G/1000ML
1000 INJECTION, SOLUTION INTRAVENOUS
Refills: 0 | Status: DISCONTINUED | OUTPATIENT
Start: 2022-01-01 | End: 2022-01-01

## 2022-01-01 RX ORDER — CALCIUM GLUCONATE 100 MG/ML
2 VIAL (ML) INTRAVENOUS ONCE
Refills: 0 | Status: COMPLETED | OUTPATIENT
Start: 2022-01-01 | End: 2022-01-01

## 2022-01-01 RX ORDER — ROBINUL 0.2 MG/ML
0.4 INJECTION INTRAMUSCULAR; INTRAVENOUS ONCE
Refills: 0 | Status: COMPLETED | OUTPATIENT
Start: 2022-01-01 | End: 2022-01-01

## 2022-01-01 RX ORDER — LEVETIRACETAM 250 MG/1
500 TABLET, FILM COATED ORAL ONCE
Refills: 0 | Status: COMPLETED | OUTPATIENT
Start: 2022-01-01 | End: 2022-01-01

## 2022-01-01 RX ORDER — CHLORHEXIDINE GLUCONATE 213 G/1000ML
15 SOLUTION TOPICAL EVERY 12 HOURS
Refills: 0 | Status: DISCONTINUED | OUTPATIENT
Start: 2022-01-01 | End: 2022-01-01

## 2022-01-01 RX ORDER — POTASSIUM CHLORIDE 20 MEQ
40 PACKET (EA) ORAL
Refills: 0 | Status: DISCONTINUED | OUTPATIENT
Start: 2022-01-01 | End: 2022-01-01

## 2022-01-01 RX ORDER — LEVETIRACETAM 250 MG/1
1000 TABLET, FILM COATED ORAL EVERY 12 HOURS
Refills: 0 | Status: DISCONTINUED | OUTPATIENT
Start: 2022-01-01 | End: 2022-01-01

## 2022-01-01 RX ORDER — FENTANYL CITRATE 50 UG/ML
50 INJECTION INTRAVENOUS ONCE
Refills: 0 | Status: DISCONTINUED | OUTPATIENT
Start: 2022-01-01 | End: 2022-01-01

## 2022-01-01 RX ORDER — PIPERACILLIN AND TAZOBACTAM 4; .5 G/20ML; G/20ML
3.38 INJECTION, POWDER, LYOPHILIZED, FOR SOLUTION INTRAVENOUS EVERY 6 HOURS
Refills: 0 | Status: DISCONTINUED | OUTPATIENT
Start: 2022-01-01 | End: 2022-01-01

## 2022-01-01 RX ORDER — MAGNESIUM SULFATE 500 MG/ML
1 VIAL (ML) INJECTION ONCE
Refills: 0 | Status: COMPLETED | OUTPATIENT
Start: 2022-01-01 | End: 2022-01-01

## 2022-01-01 RX ORDER — PHENYLEPHRINE HYDROCHLORIDE 10 MG/ML
0.2 INJECTION INTRAVENOUS
Qty: 40 | Refills: 0 | Status: DISCONTINUED | OUTPATIENT
Start: 2022-01-01 | End: 2022-01-01

## 2022-01-01 RX ORDER — PROPOFOL 10 MG/ML
10 INJECTION, EMULSION INTRAVENOUS
Qty: 500 | Refills: 0 | Status: DISCONTINUED | OUTPATIENT
Start: 2022-01-01 | End: 2022-01-01

## 2022-01-01 RX ORDER — FENTANYL CITRATE 50 UG/ML
100 INJECTION INTRAVENOUS ONCE
Refills: 0 | Status: DISCONTINUED | OUTPATIENT
Start: 2022-01-01 | End: 2022-01-01

## 2022-01-01 RX ORDER — OXYCODONE HYDROCHLORIDE 5 MG/1
5 TABLET ORAL EVERY 6 HOURS
Refills: 0 | Status: DISCONTINUED | OUTPATIENT
Start: 2022-01-01 | End: 2022-01-01

## 2022-01-01 RX ORDER — SODIUM CHLORIDE 9 MG/ML
2000 INJECTION, SOLUTION INTRAVENOUS ONCE
Refills: 0 | Status: COMPLETED | OUTPATIENT
Start: 2022-01-01 | End: 2022-01-01

## 2022-01-01 RX ORDER — SODIUM CHLORIDE 5 G/100ML
1000 INJECTION, SOLUTION INTRAVENOUS
Refills: 0 | Status: DISCONTINUED | OUTPATIENT
Start: 2022-01-01 | End: 2022-01-01

## 2022-01-01 RX ORDER — SODIUM CHLORIDE 9 MG/ML
30 INJECTION INTRAMUSCULAR; INTRAVENOUS; SUBCUTANEOUS ONCE
Refills: 0 | Status: COMPLETED | OUTPATIENT
Start: 2022-01-01 | End: 2022-01-01

## 2022-01-01 RX ORDER — LEVOTHYROXINE SODIUM 125 MCG
20 TABLET ORAL ONCE
Refills: 0 | Status: COMPLETED | OUTPATIENT
Start: 2022-01-01 | End: 2022-01-01

## 2022-01-01 RX ORDER — FENTANYL CITRATE 50 UG/ML
50 INJECTION INTRAVENOUS
Refills: 0 | Status: DISCONTINUED | OUTPATIENT
Start: 2022-01-01 | End: 2022-01-01

## 2022-01-01 RX ORDER — POTASSIUM PHOSPHATE, MONOBASIC POTASSIUM PHOSPHATE, DIBASIC 236; 224 MG/ML; MG/ML
30 INJECTION, SOLUTION INTRAVENOUS ONCE
Refills: 0 | Status: COMPLETED | OUTPATIENT
Start: 2022-01-01 | End: 2022-01-01

## 2022-01-01 RX ORDER — HYDRALAZINE HCL 50 MG
10 TABLET ORAL ONCE
Refills: 0 | Status: COMPLETED | OUTPATIENT
Start: 2022-01-01 | End: 2022-01-01

## 2022-01-01 RX ORDER — SODIUM CHLORIDE 9 MG/ML
500 INJECTION, SOLUTION INTRAVENOUS ONCE
Refills: 0 | Status: COMPLETED | OUTPATIENT
Start: 2022-01-01 | End: 2022-01-01

## 2022-01-01 RX ORDER — LEVETIRACETAM 250 MG/1
1000 TABLET, FILM COATED ORAL ONCE
Refills: 0 | Status: DISCONTINUED | OUTPATIENT
Start: 2022-01-01 | End: 2022-01-01

## 2022-01-01 RX ORDER — CEFAZOLIN SODIUM 1 G
2000 VIAL (EA) INJECTION ONCE
Refills: 0 | Status: COMPLETED | OUTPATIENT
Start: 2022-01-01 | End: 2022-01-01

## 2022-01-01 RX ORDER — POTASSIUM CHLORIDE 20 MEQ
40 PACKET (EA) ORAL ONCE
Refills: 0 | Status: COMPLETED | OUTPATIENT
Start: 2022-01-01 | End: 2022-01-01

## 2022-01-01 RX ORDER — THIAMINE MONONITRATE (VIT B1) 100 MG
500 TABLET ORAL DAILY
Refills: 0 | Status: COMPLETED | OUTPATIENT
Start: 2022-01-01 | End: 2022-01-01

## 2022-01-01 RX ORDER — PROPOFOL 10 MG/ML
10 INJECTION, EMULSION INTRAVENOUS
Qty: 1000 | Refills: 0 | Status: DISCONTINUED | OUTPATIENT
Start: 2022-01-01 | End: 2022-01-01

## 2022-01-01 RX ORDER — LEVOTHYROXINE SODIUM 125 MCG
10 TABLET ORAL
Qty: 200 | Refills: 0 | Status: DISCONTINUED | OUTPATIENT
Start: 2022-01-01 | End: 2022-01-01

## 2022-01-01 RX ORDER — SODIUM CHLORIDE 9 MG/ML
4 INJECTION INTRAMUSCULAR; INTRAVENOUS; SUBCUTANEOUS ONCE
Refills: 0 | Status: COMPLETED | OUTPATIENT
Start: 2022-01-01 | End: 2022-01-01

## 2022-01-01 RX ORDER — MIDAZOLAM HYDROCHLORIDE 1 MG/ML
5 INJECTION, SOLUTION INTRAMUSCULAR; INTRAVENOUS ONCE
Refills: 0 | Status: DISCONTINUED | OUTPATIENT
Start: 2022-01-01 | End: 2022-01-01

## 2022-01-01 RX ORDER — NOREPINEPHRINE BITARTRATE/D5W 8 MG/250ML
0.05 PLASTIC BAG, INJECTION (ML) INTRAVENOUS
Qty: 8 | Refills: 0 | Status: DISCONTINUED | OUTPATIENT
Start: 2022-01-01 | End: 2022-01-01

## 2022-01-01 RX ORDER — NOREPINEPHRINE BITARTRATE/D5W 8 MG/250ML
0.02 PLASTIC BAG, INJECTION (ML) INTRAVENOUS
Qty: 8 | Refills: 0 | Status: DISCONTINUED | OUTPATIENT
Start: 2022-01-01 | End: 2022-01-01

## 2022-01-01 RX ORDER — FUROSEMIDE 40 MG
40 TABLET ORAL ONCE
Refills: 0 | Status: COMPLETED | OUTPATIENT
Start: 2022-01-01 | End: 2022-01-01

## 2022-01-01 RX ORDER — PANTOPRAZOLE SODIUM 20 MG/1
40 TABLET, DELAYED RELEASE ORAL DAILY
Refills: 0 | Status: DISCONTINUED | OUTPATIENT
Start: 2022-01-01 | End: 2022-01-01

## 2022-01-01 RX ORDER — NIMODIPINE 60 MG/10ML
60 SOLUTION ORAL EVERY 4 HOURS
Refills: 0 | Status: DISCONTINUED | OUTPATIENT
Start: 2022-01-01 | End: 2022-01-01

## 2022-01-01 RX ADMIN — PHENYLEPHRINE HYDROCHLORIDE 39 MICROGRAM(S)/KG/MIN: 10 INJECTION INTRAVENOUS at 23:18

## 2022-01-01 RX ADMIN — SODIUM CHLORIDE 1000 MILLILITER(S): 9 INJECTION INTRAMUSCULAR; INTRAVENOUS; SUBCUTANEOUS at 10:20

## 2022-01-01 RX ADMIN — Medication 50 MICROGRAM(S)/HR: at 19:31

## 2022-01-01 RX ADMIN — Medication 250 MILLIGRAM(S): at 18:00

## 2022-01-01 RX ADMIN — PIPERACILLIN AND TAZOBACTAM 25 GRAM(S): 4; .5 INJECTION, POWDER, LYOPHILIZED, FOR SOLUTION INTRAVENOUS at 04:00

## 2022-01-01 RX ADMIN — CHLORHEXIDINE GLUCONATE 1 APPLICATION(S): 213 SOLUTION TOPICAL at 21:44

## 2022-01-01 RX ADMIN — NICARDIPINE HYDROCHLORIDE 25 MG/HR: 30 CAPSULE, EXTENDED RELEASE ORAL at 17:50

## 2022-01-01 RX ADMIN — PIPERACILLIN AND TAZOBACTAM 25 GRAM(S): 4; .5 INJECTION, POWDER, LYOPHILIZED, FOR SOLUTION INTRAVENOUS at 11:38

## 2022-01-01 RX ADMIN — Medication 50 MILLIEQUIVALENT(S): at 12:03

## 2022-01-01 RX ADMIN — MIDAZOLAM HYDROCHLORIDE 4 MILLIGRAM(S): 1 INJECTION, SOLUTION INTRAMUSCULAR; INTRAVENOUS at 12:40

## 2022-01-01 RX ADMIN — POTASSIUM PHOSPHATE, MONOBASIC POTASSIUM PHOSPHATE, DIBASIC 62.5 MILLIMOLE(S): 236; 224 INJECTION, SOLUTION INTRAVENOUS at 21:03

## 2022-01-01 RX ADMIN — LEVETIRACETAM 400 MILLIGRAM(S): 250 TABLET, FILM COATED ORAL at 05:14

## 2022-01-01 RX ADMIN — HEPARIN SODIUM 5000 UNIT(S): 5000 INJECTION INTRAVENOUS; SUBCUTANEOUS at 23:13

## 2022-01-01 RX ADMIN — PIPERACILLIN AND TAZOBACTAM 25 GRAM(S): 4; .5 INJECTION, POWDER, LYOPHILIZED, FOR SOLUTION INTRAVENOUS at 12:04

## 2022-01-01 RX ADMIN — Medication 50 MILLIEQUIVALENT(S): at 09:56

## 2022-01-01 RX ADMIN — Medication 250 MILLIGRAM(S): at 06:25

## 2022-01-01 RX ADMIN — Medication 116 MILLIGRAM(S): at 16:25

## 2022-01-01 RX ADMIN — Medication 250 MILLIGRAM(S): at 22:11

## 2022-01-01 RX ADMIN — Medication 2: at 12:15

## 2022-01-01 RX ADMIN — Medication 4: at 11:08

## 2022-01-01 RX ADMIN — PANTOPRAZOLE SODIUM 40 MILLIGRAM(S): 20 TABLET, DELAYED RELEASE ORAL at 11:00

## 2022-01-01 RX ADMIN — VASOPRESSIN 2.4 UNIT(S)/MIN: 20 INJECTION INTRAVENOUS at 04:13

## 2022-01-01 RX ADMIN — PIPERACILLIN AND TAZOBACTAM 25 GRAM(S): 4; .5 INJECTION, POWDER, LYOPHILIZED, FOR SOLUTION INTRAVENOUS at 03:51

## 2022-01-01 RX ADMIN — LEVETIRACETAM 400 MILLIGRAM(S): 250 TABLET, FILM COATED ORAL at 13:49

## 2022-01-01 RX ADMIN — CHLORHEXIDINE GLUCONATE 1 APPLICATION(S): 213 SOLUTION TOPICAL at 06:14

## 2022-01-01 RX ADMIN — Medication 1500 GRAM(S): at 21:00

## 2022-01-01 RX ADMIN — Medication 50 MICROGRAM(S)/HR: at 16:21

## 2022-01-01 RX ADMIN — CHLORHEXIDINE GLUCONATE 15 MILLILITER(S): 213 SOLUTION TOPICAL at 05:23

## 2022-01-01 RX ADMIN — Medication 250 MILLIGRAM(S): at 05:15

## 2022-01-01 RX ADMIN — Medication 4: at 18:12

## 2022-01-01 RX ADMIN — SODIUM CHLORIDE 3000 MILLILITER(S): 9 INJECTION INTRAMUSCULAR; INTRAVENOUS; SUBCUTANEOUS at 23:22

## 2022-01-01 RX ADMIN — PIPERACILLIN AND TAZOBACTAM 25 GRAM(S): 4; .5 INJECTION, POWDER, LYOPHILIZED, FOR SOLUTION INTRAVENOUS at 18:17

## 2022-01-01 RX ADMIN — FENTANYL CITRATE 100 MICROGRAM(S): 50 INJECTION INTRAVENOUS at 00:37

## 2022-01-01 RX ADMIN — HEPARIN SODIUM 5000 UNIT(S): 5000 INJECTION INTRAVENOUS; SUBCUTANEOUS at 13:55

## 2022-01-01 RX ADMIN — Medication 50 MILLIEQUIVALENT(S): at 03:50

## 2022-01-01 RX ADMIN — Medication 40 MILLIGRAM(S): at 20:07

## 2022-01-01 RX ADMIN — SODIUM CHLORIDE 3000 MILLILITER(S): 9 INJECTION, SOLUTION INTRAVENOUS at 05:07

## 2022-01-01 RX ADMIN — CHLORHEXIDINE GLUCONATE 1 APPLICATION(S): 213 SOLUTION TOPICAL at 21:05

## 2022-01-01 RX ADMIN — FENTANYL CITRATE 50 MICROGRAM(S): 50 INJECTION INTRAVENOUS at 22:46

## 2022-01-01 RX ADMIN — FENTANYL CITRATE 50 MICROGRAM(S): 50 INJECTION INTRAVENOUS at 11:30

## 2022-01-01 RX ADMIN — VASOPRESSIN 2.4 UNIT(S)/MIN: 20 INJECTION INTRAVENOUS at 12:02

## 2022-01-01 RX ADMIN — Medication 20 MICROGRAM(S): at 16:21

## 2022-01-01 RX ADMIN — LEVETIRACETAM 400 MILLIGRAM(S): 250 TABLET, FILM COATED ORAL at 06:08

## 2022-01-01 RX ADMIN — Medication 1 MILLIGRAM(S): at 12:04

## 2022-01-01 RX ADMIN — PROPOFOL 4.45 MICROGRAM(S)/KG/MIN: 10 INJECTION, EMULSION INTRAVENOUS at 09:07

## 2022-01-01 RX ADMIN — Medication 380 GRAM(S): at 03:56

## 2022-01-01 RX ADMIN — Medication 50 MILLIEQUIVALENT(S): at 08:00

## 2022-01-01 RX ADMIN — SODIUM CHLORIDE 75 MILLILITER(S): 5 INJECTION, SOLUTION INTRAVENOUS at 09:06

## 2022-01-01 RX ADMIN — CHLORHEXIDINE GLUCONATE 1 APPLICATION(S): 213 SOLUTION TOPICAL at 22:46

## 2022-01-01 RX ADMIN — SODIUM CHLORIDE 30 MILLILITER(S): 9 INJECTION INTRAMUSCULAR; INTRAVENOUS; SUBCUTANEOUS at 01:00

## 2022-01-01 RX ADMIN — LEVETIRACETAM 400 MILLIGRAM(S): 250 TABLET, FILM COATED ORAL at 05:42

## 2022-01-01 RX ADMIN — VASOPRESSIN 2.4 UNIT(S)/MIN: 20 INJECTION INTRAVENOUS at 12:39

## 2022-01-01 RX ADMIN — SODIUM CHLORIDE 8000 MILLILITER(S): 9 INJECTION, SOLUTION INTRAVENOUS at 17:33

## 2022-01-01 RX ADMIN — Medication 40 MILLIEQUIVALENT(S): at 11:39

## 2022-01-01 RX ADMIN — CHLORHEXIDINE GLUCONATE 1 APPLICATION(S): 213 SOLUTION TOPICAL at 23:13

## 2022-01-01 RX ADMIN — Medication 4: at 06:12

## 2022-01-01 RX ADMIN — Medication 15 MILLILITER(S): at 12:04

## 2022-01-01 RX ADMIN — Medication 40 MILLIEQUIVALENT(S): at 16:07

## 2022-01-01 RX ADMIN — Medication 50 MILLIEQUIVALENT(S): at 07:50

## 2022-01-01 RX ADMIN — CHLORHEXIDINE GLUCONATE 15 MILLILITER(S): 213 SOLUTION TOPICAL at 05:07

## 2022-01-01 RX ADMIN — Medication 250 MILLIGRAM(S): at 17:04

## 2022-01-01 RX ADMIN — Medication 250 MILLIGRAM(S): at 05:40

## 2022-01-01 RX ADMIN — Medication 40 MILLIEQUIVALENT(S): at 21:02

## 2022-01-01 RX ADMIN — Medication 6: at 00:18

## 2022-01-01 RX ADMIN — PANTOPRAZOLE SODIUM 40 MILLIGRAM(S): 20 TABLET, DELAYED RELEASE ORAL at 12:03

## 2022-01-01 RX ADMIN — FENTANYL CITRATE 50 MICROGRAM(S): 50 INJECTION INTRAVENOUS at 17:30

## 2022-01-01 RX ADMIN — FENTANYL CITRATE 100 MICROGRAM(S): 50 INJECTION INTRAVENOUS at 01:02

## 2022-01-01 RX ADMIN — SODIUM CHLORIDE 1000 MILLILITER(S): 9 INJECTION INTRAMUSCULAR; INTRAVENOUS; SUBCUTANEOUS at 17:51

## 2022-01-01 RX ADMIN — Medication 50 MILLILITER(S): at 09:50

## 2022-01-01 RX ADMIN — DEXTROSE MONOHYDRATE, SODIUM CHLORIDE, AND POTASSIUM CHLORIDE 200 MILLILITER(S): 50; .745; 4.5 INJECTION, SOLUTION INTRAVENOUS at 10:17

## 2022-01-01 RX ADMIN — CHLORHEXIDINE GLUCONATE 15 MILLILITER(S): 213 SOLUTION TOPICAL at 17:03

## 2022-01-01 RX ADMIN — Medication 40 MILLIEQUIVALENT(S): at 13:55

## 2022-01-01 RX ADMIN — Medication 4: at 11:39

## 2022-01-01 RX ADMIN — SENNA PLUS 2 TABLET(S): 8.6 TABLET ORAL at 21:06

## 2022-01-01 RX ADMIN — Medication 650 MILLIGRAM(S): at 11:30

## 2022-01-01 RX ADMIN — Medication 10 MILLIGRAM(S): at 21:03

## 2022-01-01 RX ADMIN — Medication 200 GRAM(S): at 02:29

## 2022-01-01 RX ADMIN — CHLORHEXIDINE GLUCONATE 15 MILLILITER(S): 213 SOLUTION TOPICAL at 06:14

## 2022-01-01 RX ADMIN — VASOPRESSIN 2.4 UNIT(S)/MIN: 20 INJECTION INTRAVENOUS at 10:06

## 2022-01-01 RX ADMIN — SODIUM CHLORIDE 500 MILLILITER(S): 9 INJECTION INTRAMUSCULAR; INTRAVENOUS; SUBCUTANEOUS at 06:05

## 2022-01-01 RX ADMIN — SODIUM CHLORIDE 1000 MILLILITER(S): 9 INJECTION INTRAMUSCULAR; INTRAVENOUS; SUBCUTANEOUS at 23:19

## 2022-01-01 RX ADMIN — PHENYLEPHRINE HYDROCHLORIDE 39 MICROGRAM(S)/KG/MIN: 10 INJECTION INTRAVENOUS at 12:03

## 2022-01-01 RX ADMIN — LEVETIRACETAM 400 MILLIGRAM(S): 250 TABLET, FILM COATED ORAL at 17:19

## 2022-01-01 RX ADMIN — PHENYLEPHRINE HYDROCHLORIDE 39 MICROGRAM(S)/KG/MIN: 10 INJECTION INTRAVENOUS at 10:06

## 2022-01-01 RX ADMIN — LEVETIRACETAM 400 MILLIGRAM(S): 250 TABLET, FILM COATED ORAL at 17:04

## 2022-01-01 RX ADMIN — Medication 650 MILLIGRAM(S): at 11:00

## 2022-01-01 RX ADMIN — Medication 100 MILLIGRAM(S): at 04:30

## 2022-01-01 RX ADMIN — PANTOPRAZOLE SODIUM 40 MILLIGRAM(S): 20 TABLET, DELAYED RELEASE ORAL at 11:38

## 2022-01-01 RX ADMIN — SODIUM CHLORIDE 4 MILLILITER(S): 9 INJECTION INTRAMUSCULAR; INTRAVENOUS; SUBCUTANEOUS at 11:04

## 2022-01-01 RX ADMIN — SODIUM CHLORIDE 500 MILLILITER(S): 5 INJECTION, SOLUTION INTRAVENOUS at 21:00

## 2022-01-01 RX ADMIN — Medication 15 MILLILITER(S): at 12:37

## 2022-01-01 RX ADMIN — VASOPRESSIN 2.4 UNIT(S)/MIN: 20 INJECTION INTRAVENOUS at 19:31

## 2022-01-01 RX ADMIN — Medication 25 MICROGRAM(S)/HR: at 19:07

## 2022-01-01 RX ADMIN — PANTOPRAZOLE SODIUM 40 MILLIGRAM(S): 20 TABLET, DELAYED RELEASE ORAL at 12:37

## 2022-01-01 RX ADMIN — LEVETIRACETAM 400 MILLIGRAM(S): 250 TABLET, FILM COATED ORAL at 17:34

## 2022-01-01 RX ADMIN — Medication 3 MILLILITER(S): at 11:16

## 2022-01-01 RX ADMIN — CHLORHEXIDINE GLUCONATE 15 MILLILITER(S): 213 SOLUTION TOPICAL at 06:30

## 2022-01-01 RX ADMIN — FENTANYL CITRATE 50 MICROGRAM(S): 50 INJECTION INTRAVENOUS at 11:45

## 2022-01-01 RX ADMIN — CHLORHEXIDINE GLUCONATE 1 APPLICATION(S): 213 SOLUTION TOPICAL at 17:33

## 2022-01-01 RX ADMIN — Medication 4: at 23:14

## 2022-01-01 RX ADMIN — Medication 20 MILLIGRAM(S): at 10:28

## 2022-01-01 RX ADMIN — Medication 40 MILLIEQUIVALENT(S): at 09:50

## 2022-01-01 RX ADMIN — Medication 105 MILLIGRAM(S): at 11:39

## 2022-01-01 RX ADMIN — Medication 2: at 06:02

## 2022-01-01 RX ADMIN — PHENYLEPHRINE HYDROCHLORIDE 5.57 MICROGRAM(S)/KG/MIN: 10 INJECTION INTRAVENOUS at 04:13

## 2022-01-01 RX ADMIN — PIPERACILLIN AND TAZOBACTAM 200 GRAM(S): 4; .5 INJECTION, POWDER, LYOPHILIZED, FOR SOLUTION INTRAVENOUS at 22:12

## 2022-01-01 RX ADMIN — PHENYLEPHRINE HYDROCHLORIDE 5.57 MICROGRAM(S)/KG/MIN: 10 INJECTION INTRAVENOUS at 18:34

## 2022-01-01 RX ADMIN — NICARDIPINE HYDROCHLORIDE 25 MG/HR: 30 CAPSULE, EXTENDED RELEASE ORAL at 23:19

## 2022-01-01 RX ADMIN — Medication 50 MILLIEQUIVALENT(S): at 11:38

## 2022-01-01 RX ADMIN — SODIUM CHLORIDE 70 MILLILITER(S): 9 INJECTION INTRAMUSCULAR; INTRAVENOUS; SUBCUTANEOUS at 23:19

## 2022-01-01 RX ADMIN — POTASSIUM PHOSPHATE, MONOBASIC POTASSIUM PHOSPHATE, DIBASIC 83.33 MILLIMOLE(S): 236; 224 INJECTION, SOLUTION INTRAVENOUS at 04:42

## 2022-01-01 RX ADMIN — PROPOFOL 3.9 MICROGRAM(S)/KG/MIN: 10 INJECTION, EMULSION INTRAVENOUS at 15:02

## 2022-01-01 RX ADMIN — CHLORHEXIDINE GLUCONATE 15 MILLILITER(S): 213 SOLUTION TOPICAL at 18:32

## 2022-01-01 RX ADMIN — Medication 116 MILLIGRAM(S): at 06:09

## 2022-01-01 RX ADMIN — Medication 100 GRAM(S): at 21:03

## 2022-01-01 RX ADMIN — MIDAZOLAM HYDROCHLORIDE 2 MILLIGRAM(S): 1 INJECTION, SOLUTION INTRAMUSCULAR; INTRAVENOUS at 12:05

## 2022-01-01 RX ADMIN — MIDAZOLAM HYDROCHLORIDE 5 MILLIGRAM(S): 1 INJECTION, SOLUTION INTRAMUSCULAR; INTRAVENOUS at 00:55

## 2022-01-01 RX ADMIN — Medication 105 MILLIGRAM(S): at 15:04

## 2022-01-01 RX ADMIN — PROPOFOL 4.45 MICROGRAM(S)/KG/MIN: 10 INJECTION, EMULSION INTRAVENOUS at 23:20

## 2022-01-01 RX ADMIN — VASOPRESSIN 2.4 UNIT(S)/MIN: 20 INJECTION INTRAVENOUS at 19:07

## 2022-01-01 RX ADMIN — CHLORHEXIDINE GLUCONATE 15 MILLILITER(S): 213 SOLUTION TOPICAL at 17:21

## 2022-01-01 RX ADMIN — SODIUM CHLORIDE 150 MILLILITER(S): 9 INJECTION INTRAMUSCULAR; INTRAVENOUS; SUBCUTANEOUS at 04:42

## 2022-01-01 RX ADMIN — SODIUM CHLORIDE 150 MILLILITER(S): 9 INJECTION INTRAMUSCULAR; INTRAVENOUS; SUBCUTANEOUS at 17:33

## 2022-01-01 RX ADMIN — PIPERACILLIN AND TAZOBACTAM 25 GRAM(S): 4; .5 INJECTION, POWDER, LYOPHILIZED, FOR SOLUTION INTRAVENOUS at 11:00

## 2022-01-01 RX ADMIN — DEXTROSE MONOHYDRATE, SODIUM CHLORIDE, AND POTASSIUM CHLORIDE 200 MILLILITER(S): 50; .745; 4.5 INJECTION, SOLUTION INTRAVENOUS at 09:00

## 2022-01-01 RX ADMIN — Medication 2: at 17:26

## 2022-01-01 RX ADMIN — POTASSIUM PHOSPHATE, MONOBASIC POTASSIUM PHOSPHATE, DIBASIC 83.33 MILLIMOLE(S): 236; 224 INJECTION, SOLUTION INTRAVENOUS at 12:39

## 2022-01-01 RX ADMIN — Medication 4: at 23:30

## 2022-01-01 RX ADMIN — POTASSIUM PHOSPHATE, MONOBASIC POTASSIUM PHOSPHATE, DIBASIC 83.33 MILLIMOLE(S): 236; 224 INJECTION, SOLUTION INTRAVENOUS at 13:52

## 2022-01-01 RX ADMIN — DEXTROSE MONOHYDRATE, SODIUM CHLORIDE, AND POTASSIUM CHLORIDE 150 MILLILITER(S): 50; .745; 4.5 INJECTION, SOLUTION INTRAVENOUS at 08:15

## 2022-01-01 RX ADMIN — SODIUM CHLORIDE 75 MILLILITER(S): 5 INJECTION, SOLUTION INTRAVENOUS at 00:37

## 2022-01-01 RX ADMIN — PIPERACILLIN AND TAZOBACTAM 25 GRAM(S): 4; .5 INJECTION, POWDER, LYOPHILIZED, FOR SOLUTION INTRAVENOUS at 18:30

## 2022-01-01 RX ADMIN — HEPARIN SODIUM 5000 UNIT(S): 5000 INJECTION INTRAVENOUS; SUBCUTANEOUS at 05:16

## 2022-01-01 RX ADMIN — Medication 50 MILLIEQUIVALENT(S): at 05:42

## 2022-01-01 RX ADMIN — DEXTROSE MONOHYDRATE, SODIUM CHLORIDE, AND POTASSIUM CHLORIDE 200 MILLILITER(S): 50; .745; 4.5 INJECTION, SOLUTION INTRAVENOUS at 23:17

## 2022-01-01 RX ADMIN — Medication 250 MILLIGRAM(S): at 17:19

## 2022-01-01 RX ADMIN — Medication 1 MILLIGRAM(S): at 12:37

## 2022-01-01 RX ADMIN — VASOPRESSIN 2.4 UNIT(S)/MIN: 20 INJECTION INTRAVENOUS at 23:18

## 2022-01-01 RX ADMIN — Medication 50 MILLIEQUIVALENT(S): at 01:45

## 2022-01-01 RX ADMIN — SODIUM CHLORIDE 1000 MILLILITER(S): 9 INJECTION, SOLUTION INTRAVENOUS at 12:59

## 2022-01-01 RX ADMIN — FENTANYL CITRATE 50 MICROGRAM(S): 50 INJECTION INTRAVENOUS at 22:47

## 2022-01-01 RX ADMIN — PIPERACILLIN AND TAZOBACTAM 25 GRAM(S): 4; .5 INJECTION, POWDER, LYOPHILIZED, FOR SOLUTION INTRAVENOUS at 19:00

## 2022-01-01 RX ADMIN — PIPERACILLIN AND TAZOBACTAM 25 GRAM(S): 4; .5 INJECTION, POWDER, LYOPHILIZED, FOR SOLUTION INTRAVENOUS at 02:29

## 2022-05-04 NOTE — PROGRESS NOTE ADULT - SUBJECTIVE AND OBJECTIVE BOX
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 s/p emergent EVD, also with c/f ??seizure s/p keppra load, txer to [facility] for further management    24 Hour Events/Subjective:  - EVD@10 ICP 8-11 with marcie bloody ouput  - poor exam, plan for repeat scan and angio      REVIEW OF SYSTEMS:  - negative except as above    VITALS:   - T(C): 36.6 (05-04-22 @ 14:50), Max: 36.6 (05-04-22 @ 14:50)  T(F): 97.9 (05-04-22 @ 14:50), Max: 97.9 (05-04-22 @ 14:50)  HR: 62 (05-04-22 @ 15:50) (58 - 94)  BP: 120/77 (05-04-22 @ 14:50) (105/81 - 131/80)  ABP: --  ABP(mean): --  RR: 18 (05-04-22 @ 15:50) (16 - 29)  SpO2: 98% (05-04-22 @ 15:50) (93% - 100%)      IMAGING/DATA:   - Reviewed          PHYSICAL EXAM:    General: ett to vent  CVS: RRR  Pulm: CTAB  GI: Soft, NTND  Extremities: No LE Edema  Neuro: intubated, 2mm sluggish reactive (difficult to assess), b/l, weak cough/gag, extensorx4   NSCU Progress Note    Assessment/Hospital Course:    41M, PMH EtOH abuse, found unresponsive at work, intubated in the field, presented to [facility] with CT shows MF4 diffuse SAH and HCP,CTA with 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 s/p emergent EVD, also with c/f ??seizure s/p keppra load, txer to [facility] for further management    24 Hour Events/Subjective:  - SAH HH5 mF4 PBD0  - EVD@10 ICP 8-11 with marcie bloody ouput  - poor exam, plan for repeat scan and angio      REVIEW OF SYSTEMS:  - negative except as above    VITALS:   - T(C): 36.6 (05-04-22 @ 14:50), Max: 36.6 (05-04-22 @ 14:50)  T(F): 97.9 (05-04-22 @ 14:50), Max: 97.9 (05-04-22 @ 14:50)  HR: 62 (05-04-22 @ 15:50) (58 - 94)  BP: 120/77 (05-04-22 @ 14:50) (105/81 - 131/80)  ABP: --  ABP(mean): --  RR: 18 (05-04-22 @ 15:50) (16 - 29)  SpO2: 98% (05-04-22 @ 15:50) (93% - 100%)      IMAGING/DATA:   - Reviewed          PHYSICAL EXAM:    General: ett to vent  CVS: RRR  Pulm: CTAB  GI: Soft, NTND  Extremities: No LE Edema  Neuro: intubated, 2mm sluggish reactive (difficult to assess), b/l, weak cough/gag, extensorx4   NSCU Progress Note    Assessment/Hospital Course:    41M, PMH EtOH abuse, found unresponsive at work, intubated in the field, presented to [facility] with CT shows MF4 diffuse SAH and HCP,CTA with 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 s/p emergent EVD, also with c/f ??seizure s/p keppra load, txer to [facility] for further management    24 Hour Events/Subjective:  - SAH HH5 mF4 PBD0  - EVD@10 ICP 8-11 with marcie bloody ouput  - poor exam, plan for repeat scan and angio    REVIEW OF SYSTEMS: [x] Unable to Assess due to neurologic exam   [ ] All ROS addressed below are non-contributory, except:  Neuro: [ ] Headache [ ] Back pain [ ] Numbness [ ] Weakness [ ] Ataxia [ ] Dizziness [ ] Aphasia [ ] Dysarthria [ ] Visual disturbance  Resp: [ ] Shortness of breath/dyspnea [ ] Orthopnea [ ] Cough  CV: [ ] Chest pain [ ] Palpitation [ ] Lightheadedness [ ] Syncope  Renal: [ ] Thirst [ ] Edema  GI: [ ] Nausea [ ] Emesis [ ] Abdominal pain [ ] Constipation [ ] Diarrhea  Hem: [ ] Hematemesis [ ] bBright red blood per rectum  ID: [ ] Fever [ ] Chills [ ] Dysuria  ENT: [ ] Rhinorrhea    VITALS:   - T(C): 36.6 (05-04-22 @ 14:50), Max: 36.6 (05-04-22 @ 14:50)  T(F): 97.9 (05-04-22 @ 14:50), Max: 97.9 (05-04-22 @ 14:50)  HR: 62 (05-04-22 @ 15:50) (58 - 94)  BP: 120/77 (05-04-22 @ 14:50) (105/81 - 131/80)  ABP: --  ABP(mean): --  RR: 18 (05-04-22 @ 15:50) (16 - 29)  SpO2: 98% (05-04-22 @ 15:50) (93% - 100%)      IMAGING/DATA:   - Reviewed    MEDICATIONS: reviewed      PHYSICAL EXAM:    General: ett to vent  CVS: RRR  Pulm: CTAB  GI: Soft, NTND  Extremities: No LE Edema  Neuro: intubated, 2mm sluggish reactive (difficult to assess), b/l, weak cough/gag, extensorx4

## 2022-05-04 NOTE — CHART NOTE - NSCHARTNOTEFT_GEN_A_CORE
Interventional Neuro Radiology  Pre-Procedure Note ROSIO    This is a 41y ____ hand dominant Male      HPI:  41M, PMH EtOH abuse, found unresponsive at work. Got intubated. CT shows MF4 SAH. 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 Exam: GCS 4T, no EO, pupils 2 sluggishly reactive, has brainstem reflexes, extensor x4   (04 May 2022 16:48)      Allergies: No Known Allergies      PAST MEDICAL & SURGICAL HISTORY:  ETOH abuse        Social History:     FAMILY HISTORY:      Current Medications: acetaminophen     Tablet .. 650 milliGRAM(s) Oral every 6 hours PRN  chlorhexidine 0.12% Liquid 15 milliLiter(s) Oral Mucosa every 12 hours  insulin lispro (ADMELOG) corrective regimen sliding scale   SubCutaneous every 6 hours  levETIRAcetam  IVPB 1000 milliGRAM(s) IV Intermittent every 12 hours  niCARdipine Infusion 5 mG/Hr IV Continuous <Continuous>  niMODipine Oral Solution 60 milliGRAM(s) Enteral Tube every 4 hours  ondansetron Injectable 4 milliGRAM(s) IV Push every 6 hours PRN  oxyCODONE    IR 5 milliGRAM(s) Oral every 6 hours PRN  oxyCODONE    IR 10 milliGRAM(s) Oral every 6 hours PRN  pantoprazole  Injectable 40 milliGRAM(s) IV Push daily  senna 2 Tablet(s) Oral at bedtime PRN      Labs:                         16.4   25.85 )-----------( 207      ( 04 May 2022 17:24 )             47.0       05-04    138  |  101  |  20  ----------------------------<  187<H>  4.2   |  17<L>  |  0.78    Ca    8.6      04 May 2022 17:24  Phos  4.4     05-04  Mg     2.0     05-04    TPro  7.0  /  Alb  4.1  /  TBili  0.3  /  DBili  x   /  AST  85<H>  /  ALT  99<H>  /  AlkPhos  120  05-04          Blood Bank: 05-04-22  O  --  Positive        Assessment/Plan:     This is a 41y  year old right  hand dominant Male  presents with   Patient presents to neuro-IR for selective cerebral angiography.   Procedure, goals, risks, benefits and alternatives  were discussed with patient and patient's family.  All questions were answered.  Risks include but are not limited to stroke, vessel injury, hemorrhage, and or right  groin hematoma.  Patient demonstrates understanding  of all risks involved with this procedure and wishes to continue.   Appropriate  content was obtained from patient and consent is in the patient's chart. Interventional Neuro Radiology  Pre-Procedure Note PA-C      This is a 41 year old male with a past medical history EtOH abuse, found unresponsive at work. Got intubated. CT shows HH5, MF4 SAH. 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     Exam: GCS 4T, no EO, pupils 2 sluggishly reactive, has brainstem reflexes, extensor x4    Allergies: No Known Allergies  PMHX: ETOH abuse  PSHX:   Social History:   FAMILY HISTORY: non-tobacco smoker     Current Medications: acetaminophen    Tablet 650 milliGRAM(s) Oral every 6 hours PRN  chlorhexidine 0.12% Liquid 15 milliLiter(s) Oral Mucosa every 12 hours  insulin lispro (ADMELOG) corrective regimen sliding scale   SubCutaneous every 6 hours  levETIRAcetam  IVPB 1000 milliGRAM(s) IV Intermittent every 12 hours  niCARdipine Infusion 5 mG/Hr IV Continuous <Continuous>  niMODipine Oral Solution 60 milliGRAM(s) Enteral Tube every 4 hours  ondansetron Injectable 4 milliGRAM(s) IV Push every 6 hours PRN  oxyCODONE    IR 5 milliGRAM(s) Oral every 6 hours PRN  oxyCODONE    IR 10 milliGRAM(s) Oral every 6 hours PRN  pantoprazole  Injectable 40 milliGRAM(s) IV Push daily  senna 2 Tablet(s) Oral at bedtime PRN      Labs:                         16.4   25.85 )-----------( 207      ( 04 May 2022 17:24 )             47.0       05-04    138  |  101  |  20  ----------------------------<  187<H>  4.2   |  17<L>  |  0.78    Ca    8.6      04 May 2022 17:24  Phos  4.4     05-04  Mg     2.0     05-04    TPro  7.0  /  Alb  4.1  /  TBili  0.3  /  DBili  x   /  AST  85<H>  /  ALT  99<H>  /  AlkPhos  120  05-04      Blood Bank: 0 positive available       Assessment/Plan:   This is a 41 year old right hand dominant   Patient presents to neuro-IR for selective cerebral angiography.   Procedure, goals, risks, benefits and alternatives  were discussed with patient and patient's family.  All questions were answered.  Risks include but are not limited to stroke, vessel injury, hemorrhage, and or right  groin hematoma.  Patient demonstrates understanding  of all risks involved with this procedure and wishes to continue.   Appropriate  content was obtained from patient and consent is in the patient's chart. Interventional Neuro Radiology  Pre-Procedure Note PA-C      This is a 41 year old right hand dominant male with a past medical history EtOH abuse, found unresponsive at work. Got intubated. CT shows HH5, MF4 SAH. 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     Exam: GCS 4T, no EO, pupils 2 sluggishly reactive, has brainstem reflexes, extensor x4    Allergies: No Known Allergies  PMHX: ETOH abuse  PSHX:   Social History:   FAMILY HISTORY: non-tobacco smoker     Current Medications: acetaminophen    Tablet 650 milliGRAM(s) Oral every 6 hours PRN  chlorhexidine 0.12% Liquid 15 milliLiter(s) Oral Mucosa every 12 hours  insulin lispro (ADMELOG) corrective regimen sliding scale   SubCutaneous every 6 hours  levETIRAcetam  IVPB 1000 milliGRAM(s) IV Intermittent every 12 hours  niCARdipine Infusion 5 mG/Hr IV Continuous <Continuous>  niMODipine Oral Solution 60 milliGRAM(s) Enteral Tube every 4 hours  ondansetron Injectable 4 milliGRAM(s) IV Push every 6 hours PRN  oxyCODONE    IR 5 milliGRAM(s) Oral every 6 hours PRN  oxyCODONE    IR 10 milliGRAM(s) Oral every 6 hours PRN  pantoprazole  Injectable 40 milliGRAM(s) IV Push daily  senna 2 Tablet(s) Oral at bedtime PRN      Labs:                         16.4   25.85 )-----------( 207      ( 04 May 2022 17:24 )             47.0       05-04    138  |  101  |  20  ----------------------------<  187<H>  4.2   |  17<L>  |  0.78    Ca    8.6      04 May 2022 17:24  Phos  4.4     05-04  Mg     2.0     05-04    TPro  7.0  /  Alb  4.1  /  TBili  0.3  /  DBili  x   /  AST  85<H>  /  ALT  99<H>  /  AlkPhos  120  05-04      Blood Bank: 0 positive available       Assessment/Plan:   This is a 41 year old right hand dominant male with a SAH HH5 mF4, who is transported to Neuro-IR for selective cerebral angiography.   Procedure, goals, risks, benefits and alternatives were discussed patient's cousin Ramsey. All questions were answered. Risks include but are not limited to stroke, vessel injury, hemorrhage, and or right groin hematoma. Patient demonstrates understanding  of all risks involved with this procedure and wishes to continue. Appropriate consent was obtained from patient's cousin Ramsey and consent is in the patient's chart.

## 2022-05-04 NOTE — ED ADULT NURSE NOTE - OBJECTIVE STATEMENT
Pt brought in by EMS after being found unresponsive at work, pt presents intubated, brought back directly to Tr-A. Pt placed on cardiac monitor, NSR on monitor, 18g IVL to rt AC, labs collected and sent, pt currently on Propofol for sedation, given IV versed, ED team at bedside, will continue to monitor.

## 2022-05-04 NOTE — ED PROVIDER NOTE - NS ED ATTENDING STATEMENT MOD
This was a shared visit with the ARUN. I reviewed and verified the documentation and independently performed the documented:

## 2022-05-04 NOTE — ED PROVIDER NOTE - PROGRESS NOTE DETAILS
JAILYN ABEL: CT shows SAH, neurosurgery paged. JAILYN ABEL: spoke with neurosurgery, will see pt in ED. Will continue to monitor and reassess. JAILYN ABEL: pt seen & evaluated by neurosurgery, states will do EVD at bedside, transfer to [facility]. Rosa:  pt seen by neurosurgery.  EVD being placed at bedside.  will be transferred to [facility] neuro ICU under Dr. Grimes's service. JAILYN ABEL: Spoke with pt's cousin Ramsey regarding pt's condition & diagnosis, all questions answered, using pacific . As per pt's cousin, pt's parents are in [place], no siblings. Oral gastric tube placed due to vomiting, Xray ordered to confirm  placement. Pt waiting for EMS transport to [facility].

## 2022-05-04 NOTE — DISCHARGE NOTE FOR THE EXPIRED PATIENT - HOSPITAL COURSE
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 for further management. 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 His exam was poor post op and he was declared brain dead on 5/7/22 at 1339 by Dr. Harrell. Family at bedside at the time of pronouncement. Emotional support given to family

## 2022-05-04 NOTE — PROGRESS NOTE ADULT - ASSESSMENT
ASSESSMENT/PLAN:     HH4 mF4 subarachnoid hemorrhage, post-bleed day 0      NEURO:  - Treatment: OR vs. IR for aneurysm treatment  - Seizure (reported at OSH, unsure if accurate) Levetiracetam 1g BID  - Delayed Cerebral Ischemia Management: Serial screening TCDs, euvolemia, nimodipine  - Hydrocephalus: EVD in place @10, ICP goal <22 CPP 60-70  - Pain: PRN analgesics      PULM:  Intubated in the field for airway protection  - ETT to vent  - abg, CXR for ETT confirmation  - VAP bundle    CV:  - SBP goal 100-140 for unsecured aneurysm  - TTE  - EKG  - TSH, A1c, lipid panel  - trend lactate    RENAL:  - Fluids: 75cc/h NS until tolerating full diet  - argueta  - bmp  - goal na 135-145    GI:  - Diet: NPO  - GI prophylaxis: PPI while intubated  - Bowel regimen     Endo:  - Goal euglycemia (-180)    HEME/ONC:  - VTE prophylaxis: hold d/t fresh heme  - SCDs  - LED    ID:  monitor for fevers       ASSESSMENT/PLAN:     HH5 mF4 subarachnoid hemorrhage, post-bleed day 0      NEURO:  - Treatment: OR vs. IR for aneurysm treatment  - Seizure (reported at OSH, unsure if accurate) Levetiracetam 1g BID  - Delayed Cerebral Ischemia Management: Serial screening TCDs, euvolemia, nimodipine  - Hydrocephalus: EVD in place @10, ICP goal <22 CPP 60-70  - Pain: PRN analgesics      PULM:  Intubated in the field for airway protection  - ETT to vent  - abg, CXR for ETT confirmation  - VAP bundle    CV:  - SBP goal 100-140 for unsecured aneurysm  - TTE  - EKG  - TSH, A1c, lipid panel  - trend lactate    RENAL:  - Fluids: 75cc/h NS until tolerating full diet  - argueta  - bmp  - goal na 135-145    GI:  - Diet: NPO  - GI prophylaxis: PPI while intubated  - Bowel regimen     Endo:  - Goal euglycemia (-180)    HEME/ONC:  - VTE prophylaxis: hold d/t fresh heme  - SCDs  - LED    ID:  monitor for fevers       ASSESSMENT/PLAN:     HH5 mF4 subarachnoid hemorrhage, post-bleed day 0      NEURO:  - Treatment: OR vs. IR for aneurysm treatment  - Seizure (reported at OSH, unsure if accurate) Levetiracetam 1g BID  - Delayed Cerebral Ischemia Management: Serial screening TCDs, euvolemia, nimodipine  - Hydrocephalus: EVD in place @10, ICP goal <22 CPP 60-70  - Pain: PRN analgesics  hx daily EtOH per report, CIWA, thiamine, folate, mvi      PULM:  Intubated in the field for airway protection  - ETT to vent  - abg, CXR for ETT confirmation  - VAP bundle    CV:  - SBP goal 100-140 for unsecured aneurysm  - TTE  - EKG  - TSH, A1c, lipid panel  - trend lactate    RENAL:  - Fluids: 75cc/h NS until tolerating full diet  - argueta  - bmp  - goal na 135-145    GI:  - Diet: NPO  - GI prophylaxis: PPI while intubated  - Bowel regimen     Endo:  - Goal euglycemia (-180)    HEME/ONC:  - VTE prophylaxis: hold d/t fresh heme  - SCDs  - LED    ID:  monitor for fevers      at risk for deterioration/death due to ruptured aneurysm, hydrocephalus, brain compression, respiratory failure requiring intubation  icu  full code

## 2022-05-04 NOTE — PROGRESS NOTE ADULT - SUBJECTIVE AND OBJECTIVE BOX
O:  T(C): 36.4 (05-04-22 @ 15:50), Max: 36.6 (05-04-22 @ 14:50)  HR: 66 (05-04-22 @ 17:00) (58 - 94)  BP: 158/70 (05-04-22 @ 17:38) (84/68 - 158/70)  RR: 22 (05-04-22 @ 16:00) (16 - 29)  SpO2: 100% (05-04-22 @ 17:00) (93% - 100%)  05-04-22 @ 07:01  -  05-04-22 @ 18:33  --------------------------------------------------------  IN: 1000 mL / OUT: 25 mL / NET: 975 mL    acetaminophen     Tablet .. 650 milliGRAM(s) Oral every 6 hours PRN  chlorhexidine 0.12% Liquid 15 milliLiter(s) Oral Mucosa every 12 hours  folic acid Injectable 1 milliGRAM(s) IV Push daily  insulin lispro (ADMELOG) corrective regimen sliding scale   SubCutaneous every 6 hours  levETIRAcetam  IVPB 1000 milliGRAM(s) IV Intermittent every 12 hours  multivitamin/minerals/iron Oral Solution (CENTRUM) 15 milliLiter(s) Oral daily  niCARdipine Infusion 5 mG/Hr IV Continuous <Continuous>  niMODipine Oral Solution 60 milliGRAM(s) Enteral Tube every 4 hours  ondansetron Injectable 4 milliGRAM(s) IV Push every 6 hours PRN  oxyCODONE    IR 5 milliGRAM(s) Oral every 6 hours PRN  oxyCODONE    IR 10 milliGRAM(s) Oral every 6 hours PRN  pantoprazole  Injectable 40 milliGRAM(s) IV Push daily  senna 2 Tablet(s) Oral at bedtime PRN  thiamine IVPB 500 milliGRAM(s) IV Intermittent daily  Mode: AC/ CMV (Assist Control/ Continuous Mandatory Ventilation), RR (machine): 16, TV (machine): 450, FiO2: 100, PEEP: 5, ITime: 1, MAP: 10, PIP: 20  NEURO EXAM     LABS:  Na: 138 (05-04 @ 17:24), 137 (05-04 @ 12:15)  K: 4.2 (05-04 @ 17:24), 3.5 (05-04 @ 12:15)  Cl: 101 (05-04 @ 17:24), 100 (05-04 @ 12:15)  CO2: 17 (05-04 @ 17:24), 21 (05-04 @ 12:15)  BUN: 20 (05-04 @ 17:24), 18 (05-04 @ 12:15)  Cr: 0.78 (05-04 @ 17:24), 1.06 (05-04 @ 12:15)  Glu: 187(05-04 @ 17:24), 258(05-04 @ 12:15)    Hgb: 16.4 (05-04 @ 17:24), 16.1 (05-04 @ 12:15)  Hct: 47.0 (05-04 @ 17:24), 46.8 (05-04 @ 12:15)  WBC: 25.85 (05-04 @ 17:24), 14.29 (05-04 @ 12:15)  Plt: 207 (05-04 @ 17:24), 219 (05-04 @ 12:15)    INR: 1.07 05-04-22 @ 13:04  PTT: 31.9 05-04-22 @ 12:15            a/p aSAH HHS 5 , mFS 4   Neuro: neuro checks q 1 hr  CT head tomorrow morning   angio today    nimodipine   keppra 1000 mg BID for ? seizure   hydrocephalus EVD 10 cm water  ICP<20 mmhg, and CPP> 60 mmhg, fentanyl PRN for agitation   Respiratory: intubated, will get chest xray, ABG  CV: NSR,  -140 mmhg   Endocrine: finger sticks q6hrs, ISS , keep sugar 120-180 mmhg   Heme/Onc: INR <1.5, Plt>100            DVT ppx: SCD, contraindicated to start anticoagulant as she is PBD0   Renal: NS 75 ml/hr  ID: afebrile  GI: NPO, protonix, NG, colace   Social/Family: updated at bedside  Discharge planning: ICU    Code Status: [x] Full Code [] DNR [] DNI [] Goals of Care:   Disposition: [x] ICU [] Stroke Unit [] RCU []PCU []Floor [] Discharge Home     Patient at high risk for neurologic deterioration, aneurysm rerupture, seizures, ICP crisis, critical care time, excluding procedures: 30 minutes       O:  T(C): 36.4 (05-04-22 @ 15:50), Max: 36.6 (05-04-22 @ 14:50)  HR: 66 (05-04-22 @ 17:00) (58 - 94)  BP: 158/70 (05-04-22 @ 17:38) (84/68 - 158/70)  RR: 22 (05-04-22 @ 16:00) (16 - 29)  SpO2: 100% (05-04-22 @ 17:00) (93% - 100%)  05-04-22 @ 07:01  -  05-04-22 @ 18:33  --------------------------------------------------------  IN: 1000 mL / OUT: 25 mL / NET: 975 mL    acetaminophen     Tablet .. 650 milliGRAM(s) Oral every 6 hours PRN  chlorhexidine 0.12% Liquid 15 milliLiter(s) Oral Mucosa every 12 hours  folic acid Injectable 1 milliGRAM(s) IV Push daily  insulin lispro (ADMELOG) corrective regimen sliding scale   SubCutaneous every 6 hours  levETIRAcetam  IVPB 1000 milliGRAM(s) IV Intermittent every 12 hours  multivitamin/minerals/iron Oral Solution (CENTRUM) 15 milliLiter(s) Oral daily  niCARdipine Infusion 5 mG/Hr IV Continuous <Continuous>  niMODipine Oral Solution 60 milliGRAM(s) Enteral Tube every 4 hours  ondansetron Injectable 4 milliGRAM(s) IV Push every 6 hours PRN  oxyCODONE    IR 5 milliGRAM(s) Oral every 6 hours PRN  oxyCODONE    IR 10 milliGRAM(s) Oral every 6 hours PRN  pantoprazole  Injectable 40 milliGRAM(s) IV Push daily  senna 2 Tablet(s) Oral at bedtime PRN  thiamine IVPB 500 milliGRAM(s) IV Intermittent daily  Mode: AC/ CMV (Assist Control/ Continuous Mandatory Ventilation), RR (machine): 16, TV (machine): 450, FiO2: 100, PEEP: 5, ITime: 1, MAP: 10, PIP: 20  NEURO EXAM     LABS:  Na: 138 (05-04 @ 17:24), 137 (05-04 @ 12:15)  K: 4.2 (05-04 @ 17:24), 3.5 (05-04 @ 12:15)  Cl: 101 (05-04 @ 17:24), 100 (05-04 @ 12:15)  CO2: 17 (05-04 @ 17:24), 21 (05-04 @ 12:15)  BUN: 20 (05-04 @ 17:24), 18 (05-04 @ 12:15)  Cr: 0.78 (05-04 @ 17:24), 1.06 (05-04 @ 12:15)  Glu: 187(05-04 @ 17:24), 258(05-04 @ 12:15)    Hgb: 16.4 (05-04 @ 17:24), 16.1 (05-04 @ 12:15)  Hct: 47.0 (05-04 @ 17:24), 46.8 (05-04 @ 12:15)  WBC: 25.85 (05-04 @ 17:24), 14.29 (05-04 @ 12:15)  Plt: 207 (05-04 @ 17:24), 219 (05-04 @ 12:15)    INR: 1.07 05-04-22 @ 13:04  PTT: 31.9 05-04-22 @ 12:15            a/p aSAH HHS 5 , mFS 4 s/p left ICA terminus aneurysm, 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 seizure   hydrocephalus EVD 10 cm water  ICP<20 mmhg, and CPP> 60 mmhg, fentanyl PRN for agitation   Respiratory: intubated, will get chest xray, ABG  CV: NSR,  -140 mmhg   groin check , pulse check   Endocrine: finger sticks q6hrs, ISS , keep sugar 120-180 mmhg   Heme/Onc: INR <1.5, Plt>100            DVT ppx: SCD, contraindicated to start anticoagulant as she is PBD0   Renal: NS 75 ml/hr  ID: afebrile  GI: NPO, protonix, NG, colace   Social/Family: updated at bedside  Discharge planning: ICU    Code Status: [x] Full Code [] DNR [] DNI [] Goals of Care:   Disposition: [x] ICU [] Stroke Unit [] RCU []PCU []Floor [] Discharge Home     Patient at high risk for neurologic deterioration, aneurysm rerupture, seizures, ICP crisis, critical care time, excluding procedures: 30 minutes       aSAH HHS 5 , mFS 4 s/p left ICA terminus aneurysm, 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 seizure   hydrocephalus EVD 10 cm water  ICP<20 mmhg, and CPP> 60 mmhg, fentanyl PRN for agitation   Respiratory: intubated, will get chest xray, ABG  CV: NSR,  -140 mmhg   groin check , pulse check   Endocrine: finger sticks q6hrs, ISS , keep sugar 120-180 mmhg   Heme/Onc: INR <1.5, Plt>100            DVT ppx: SCD, contraindicated to start anticoagulant as she is PBD0   Renal: NS 75 ml/hr  ID: afebrile  GI: NPO, protonix, NG, colace   Social/Family: updated at bedside  Discharge planning: ICU    Code Status: [x] Full Code [] DNR [] DNI [] Goals of Care:   Disposition: [x] ICU [] Stroke Unit [] RCU []PCU []Floor [] Discharge Home     Patient at high risk for neurologic deterioration, aneurysm rerupture, seizures, ICP crisis, critical care time, excluding procedures: 30 minutes       aSAH HHS 5 , mFS 4 s/p left ICA terminus aneurysm, 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 seizure   Respiratory: intubated  ET in good position , ABG  mucomyst and duonebs   CV: NSR,  -140 mmhg   groin check , pulse check   Endocrine:  keep sugar 124-180 mmhg , finger sticks q 6 hr, ISS   Heme/Onc: INR <1.5, Plt>100            DVT ppx: SCD, contraindicated to start anticoagulant as she is PBD0   Renal: 2% 50 ml/hr, BMP q 6 hr   ID: afebrile  GI: NPO, pantoprazole , NG, senna and miralax   Social/Family: updated at bedside  Discharge planning: ICU    Code Status: [x] Full Code [] DNR [] DNI [] Goals of Care:   Disposition: [x] ICU [] Stroke Unit [] RCU []PCU []Floor [] Discharge Home     Patient at high risk for neurologic deterioration, aneurysm rerupture, seizures, ICP crisis, critical care time, excluding procedures: 60 minutes       aSAH HHS 5 , mFS 4 s/p left ICA terminus aneurysm, 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 seizure   Respiratory: intubated  will get chest xray, , ABG  mucomyst and duonebs   CV: NSR,  -140 mmhg   lactic acidosis, will trend q 6 hr   groin check , pulse check   Endocrine:  keep sugar 124-180 mmhg , finger sticks q 6 hr, ISS   Heme/Onc: INR <1.5, Plt>100            DVT ppx: SCD, contraindicated to start anticoagulant as she is PBD0   Renal: 2% 75 ml/hr, BMP q 6 hr , will give 23.4 %   ID: afebrile  GI: NPO, pantoprazole , NG, senna and miralax   Social/Family: updated at bedside  Discharge planning: ICU    Code Status: [x] Full Code [] DNR [] DNI [] Goals of Care:   Disposition: [x] ICU [] Stroke Unit [] RCU []PCU []Floor [] Discharge Home     Patient at high risk for neurologic deterioration, aneurysm rerupture, seizures, ICP crisis, critical care time, excluding procedures: 60 minutes       aSAH HHS 5 , mFS 4 s/p left ICA terminus aneurysm, 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 seizure   Respiratory: intubated  will get chest xray, , ABG  mucomyst and duonebs   CV: NSR,  -140 mmhg   lactic acidosis, will trend q 6 hr   groin check , pulse check   Endocrine:  keep sugar 124-180 mmhg , finger sticks q 6 hr, ISS   Heme/Onc: INR <1.5, Plt>100            DVT ppx: SCD, contraindicated to start anticoagulant as she is PBD0   Renal: 2% 75 ml/hr, BMP q 6 hr , will give 23.4 %   ID: afebrile  GI: NPO, pantoprazole , NG, senna and miralax   Social/Family: updated at bedside  Discharge planning: ICU    Code Status: [x] Full Code [] DNR [] DNI [] Goals of Care:   Disposition: [x] ICU [] Stroke Unit [] RCU []PCU []Floor [] Discharge Home     Patient at high risk for neurologic deterioration, aneurysm rerupture, seizures, ICP crisis, critical care time, excluding procedures: 75 minutes       aSAH HHS 5 , mFS 4 s/p left ICA terminus aneurysm, 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 seizure   Respiratory: intubated  will get chest xray, , ABG  mucomyst and duonebs   CV: NSR,  -140 mmhg   lactic acidosis, will trend q 6 hr   groin check , pulse check   Endocrine:  keep sugar 124-180 mmhg , finger sticks q 6 hr, ISS   Heme/Onc: INR <1.5, Plt>100            DVT ppx: SCD, contraindicated to start anticoagulant as she is PBD0   Renal: 2% 75 ml/hr, BMP q 6 hr , will give 23.4 %   ID: afebrile  GI: NPO, pantoprazole , NG, senna and miralax   Social/Family: updated at bedside  Discharge planning: ICU    Code Status: [x] Full Code [] DNR [] DNI [] Goals of Care:   Disposition: [x] ICU [] Stroke Unit [] RCU []PCU []Floor [] Discharge Home     Patient at high risk for neurologic deterioration, aneurysm rerupture, seizures, ICP crisis, critical care time, excluding procedures: 75 minutes      Addendum:  The current condition of the patient and the prognosis was discussed with the family (The patient's cosine, delegated by the patients parents abroad), the family opted for DNR status, otherwise they want him to have full medical care.

## 2022-05-04 NOTE — H&P ADULT - ASSESSMENT
41M, PMH EtOH abuse, found unresponsive at work. Got intubated. CT shows MF4 SAH. 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 Exam: GCS 4T, no EO, pupils 2 sluggishly reactive, has brainstem reflexes, extensor x4  -Adm NSCU, SBP <140, coags, plt wnl  -Right EVD at 10, output bloody  -Repeat CT now to check EVD placement  -Preop for Angio and/or OR

## 2022-05-04 NOTE — ED ADULT NURSE REASSESSMENT NOTE - NS ED NURSE REASSESS COMMENT FT1
Mobile Critical Care RN: patient is 41/M BIBEMS, found unresponsive at work, intubated emergently in the field with 7.5 ETT 25 @ lip, c-collar in place. patient brought to -A, sedated on propofol, labs obtained. emergent CT, positive findings for diffuse subarachnoid hemorrhage. neurosurg at bedside to assess. patient overbreathing ventilator and biting down on tube. movements noted in upper and lower extremities, no purposeful movements however. slight withdrawal noted from pain. propofol held and restarted post assessment. R frontal EVD drain placed at bedside. 14F argueta placed with clear yellow initial output. Mobile Critical Care RN: patient is 41/M BIBEMS, found unresponsive at work, intubated emergently in the field with 7.5 ETT 25 @ lip, c-collar in place. patient brought to -A, sedated on propofol, found to have bradycardia as low as 55 and tachycardic as high as 122. 12L EKGs completed. MAP stable. labs obtained. emergent CT completed, positive findings for diffuse subarachnoid hemorrhage. neurosurg at bedside to assess. patient overbreathing ventilator and biting down on tube. movements noted in upper and lower extremities, no purposeful movements however. slight withdrawal noted from pain. propofol held and restarted post assessment. keppra loaded, 1L LR given. R frontal EVD drain placed at bedside. patient had 1 episode of brown colored emesis, OGT placed and placed to intermittent suction. 14F argueta placed with clear yellow initial output. patient to be transferred to [facility].

## 2022-05-04 NOTE — ED ADULT TRIAGE NOTE - CHIEF COMPLAINT QUOTE
Pt arrives via ambulance as notification for unresponsiveness. Pt arrives intubated, directed to TrA.

## 2022-05-04 NOTE — DISCHARGE NOTE NURSING/CASE MANAGEMENT/SOCIAL WORK - PATIENT PORTAL LINK FT
You can access the FollowMyHealth Patient Portal offered by [facility] by registering at the following website: [email or web address]. By joining [facility]’s FollowMyHealth portal, you will also be able to view your health information using other applications (apps) compatible with our system.

## 2022-05-04 NOTE — PROVIDER CONTACT NOTE (CRITICAL VALUE NOTIFICATION) - SITUATION
pt intubated, sedated, with diagnoses of subdural hematoma, being transferred to [facility] neuro ICU

## 2022-05-04 NOTE — CONSULT NOTE ADULT - ASSESSMENT
41 yr old male w/ SAH HH grade 5  -D/w family (cousin/next of kin), consent obtained for emergent EVD procedure at bedside  -Coags sent off  -NPO  -IVF  -D/w [facility] team to arrange transfer to [facility]  -D/w Dr Smith  (*See separate procedure note for EVD insertion note)

## 2022-05-04 NOTE — H&P ADULT - NSHPLABSRESULTS_GEN_ALL_CORE
CTA shows Left ICA terminus aneurysm. Exam: GCS 4T, no EO, pupils 2 sluggishly reactive, has brainstem reflexes, extensor x4

## 2022-05-04 NOTE — ED PROCEDURE NOTE - CPROC ED TOLERANCE1
Patient tolerated procedure well. Class I (easy) - visualization of the soft palate, fauces, uvula, and both anterior and posterior pillars

## 2022-05-04 NOTE — CHART NOTE - NSCHARTNOTEFT_GEN_A_CORE
Interventional Neuro- Radiology   Procedure Note PA-AKIKO    Procedure: Selective Cerebral Angiography   Pre- Procedure Diagnosis:  Post- Procedure Diagnosis:    : Dr Mirian Sorensen   Fellow:      Physician Assistant: Aimee Gray PA-C    Nurse:  Radiologic Tech:  Anesthesiologist:  Sheath:      I/Os: EBL less than 10cc  IV fluids:     cc     Urine output     cc    Contrast Omnipaque 240      cc             Vitals: BP         HR      Spo2     %          Preliminary Report:  Using a 5 Citizen of [place] long sheath to the right groin under MAC sedation via left vertebral artery,  left internal carotid artery, left external carotid artery, right vertebral artery, right internal carotid artery, right external carotid artery a selective cerebral angiography was performed and demonstrated                       Official note to follow.  Patient tolerated procedure well, hemodynamically stable, no change in neurological status compared to baseline.  Results discussed with neuro ICU team, patient and patient's family. 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     : Dr Mirian Sorensen   Fellow:    Dr Roger Bhakta   Physician Assistant: Aimee Gray PA-C    Nurse:                   Mark Méndez RN  Radiologic Tech:   Adam Polanco LRT, Taz Jalloh LRT  Anesthesiologist:   Dr Nanci Martinez   Sheath:                  8 Syrian short sheath   EVD:                      20      I/Os: EBL less than 10cc  IV fluids: 2800cc Urine 900cc  Contrast Omnipaque 240 96cc       [id number]  Heparin 2,ooo units IV push          Vitals: /72        HR 90    Spo2 100%          Preliminary Report:  Using a 8 Syrian short sheath to the right groin under general anesthesia a catheter cerebral angiography and balloon assisted coiling was performed. Of note there is also a right para-ophthalmic artery aneurysm. Official note to follow.  Patient tolerated procedure well, hemodynamically stable, no change in neurological status compared to baseline. Results discussed with neuro ICU team. Right groin sheath was removed, a CELT device and manual compression held to hemostasis for 15 minutes, no active bleeding, no hematoma, quick clot and safeguard balloon dressing applied at 2000 hours.

## 2022-05-04 NOTE — CONSULT NOTE ADULT - SUBJECTIVE AND OBJECTIVE BOX
HPI:  41y male presenting after being brought in by EMS after collapsing at work. Intubated in the field by EMS. Per report had R blown pupil. +report of EtoH abuse.     PAST MEDICAL & SURGICAL HISTORY:  ETOH abuse      Allergies    No Known Allergies      chlorhexidine 0.12% Liquid 15 milliLiter(s) Oral Mucosa every 12 hours      Vital Signs Last 24 Hrs  T(C): 36.4 (04 May 2022 12:35), Max: 36.4 (04 May 2022 12:35)  T(F): 97.5 (04 May 2022 12:35), Max: 97.5 (04 May 2022 12:35)  HR: 70 (04 May 2022 14:25) (58 - 94)  BP: 129/89 (04 May 2022 14:25) (105/81 - 131/80)  BP(mean): 94 (04 May 2022 14:15) (84 - 97)  RR: 25 (04 May 2022 14:25) (16 - 29)  SpO2: 98% (04 May 2022 14:25) (93% - 100%)    PHYSICAL EXAM:  Intubated, not paralyzed, pupils 1mm pinpoint BL  No corneal reflex, +gag   No response to painful stimuli in all 4 extremities      LABS:                          16.1   14.29 )-----------( 219      ( 04 May 2022 12:15 )             46.8     05-04    137  |  100  |  18  ----------------------------<  258<H>  3.5   |  21<L>  |  1.06    Ca    8.2<L>      04 May 2022 12:15    TPro  7.0  /  Alb  4.1  /  TBili  0.3  /  DBili  x   /  AST  85<H>  /  ALT  99<H>  /  AlkPhos  120  05-04    PT/INR - ( 04 May 2022 13:04 )   PT: 12.4 sec;   INR: 1.07 ratio         PTT - ( 04 May 2022 12:15 )  PTT:31.9 sec      RADIOLOGY & ADDITIONAL STUDIES:  < from: CT Angio Neck w/ IV Cont (05.04.22 @ 12:37) >    ACC: [id number] EXAM:  CT ANGIO BRAIN (W)AW                         ACC: [id number] EXAM:  CT ANGIO NECK (W)AW IC                          PROCEDURE DATE:  05/04/2022          INTERPRETATION:  CLINICAL INDICATION: Head bleed. Evaluate for aneurysm.    TECHNIQUE: CTA of the head and neck was performed after the intravenous   administration of  90 mL Omnipaque 350 nonionic IV contrast. MIP   reconstructions were performed on a separate workstation and reviewed.    COMPARISON: Immediately preceding CT head.    FINDINGS:    CTA NECK:    The visualized portion of the aortic arch is unremarkable in appearance.   The origins of the great vessels and the vertebral arteries are normal   without evidence of stenosis.    The common carotid arteries are patent bilaterally without evidence of   stenosis. The carotid bifurcations are without stenosis bilaterally.    There is atherosclerotic plaque involving the distal right ICA without   hemodynamically significant stenosis. Unremarkable left cervical ICA.    Multiple stenoses throughout the right vertebral artery, with up to focal   severe stenosis at the level of C1 (5-317). Left cervical vertebral   artery is without stenosis    CTA HEAD:    There is a 0.5 x 0.4 cm anteriorly oriented left ICA terminus aneurysm.   Remainder of the bilateral distal ICAs are patent.    There is diminutive appearance of the right KENNEDY A2 segment (see key   images), unclear whether secondary to artifact from diffuse subarachnoid   hemorrhage or vasospasm. The left KENNEDY is without stenosis.    Slightly diminutive appearance of the left MCA M1 and M2 segments   compared to the right, unclear whether secondary to artifact from   subarachnoid hemorrhage or vasospasm. Remainder of the bilateral MCAs are   without stenosis/occlusion.    Posterior cerebral arteries are without stenosis/occlusion.    At least moderate stenosis of the distal right vertebral artery.    No evidence of vascular malformation.    OTHER:  Endotracheal tube is in place.  Airspace opacities throughout the bilateral upper lung concerning for   infectious/inflammatory process and/or aspiration.      IMPRESSION:  Please see separate dedicated report for noncontrast CT head.    CT angiogram head:  -0.5 cm left ICA terminus aneurysm, likelythe source of subarachnoid   hemorrhage. Findings discussed with JAILYN Washington at 12:50 PM 5/4/2022.    -Diminutive appearance of the right KENNEDY A2 and left MCA M1 and M2   segments, possibly secondary to artifact from subarachnoid hemorrhage or   vasospasm.Attention on follow-up studies is recommended.    CT angiogram neck:  -Multiple stenoses throughout the right cervical vertebral artery, with   up to severe stenosis distally at the level of C1.    Other:  -Bilateral upper lung infectious/inflammatory process and/or aspiration.    _____________  NASCET criteria for internal carotid artery stenosis:  Mild: 0% to 49%  Moderate: 50% to 69%  Severe: 70% to 99%  Complete Occlusion    --- End of Report ---            QUITA PETERSON MD; Attending Radiologist  This document has been electronically signed. May  4 2022  1:03PM    < end of copied text >

## 2022-05-04 NOTE — ED PROVIDER NOTE - OBJECTIVE STATEMENT
40 yo M with PMH of ETOH abuse, BIBEMS to ED c/o unresponsive while at work today, given Narcan, intubated. 40 yo M with PMH of ETOH abuse, BIBEMS to ED c/o unresponsive while at work today, given Narcan, intubated.    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  pt told his coworker he didn't feel well and collapsed.  had vomiting blood at the scene.  EMS arrived and intubated pt for airway protection.  pt was in sinus rhythm.  only medical history is alcohol abuse, but no history of seizure or withdrawal symptoms. 40 yo M with PMH of ETOH abuse, BIBEMS to ED c/o unresponsive while at work today, given Narcan, intubated in the field.    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  pt told his coworker he didn't feel well and collapsed.  had vomiting blood at the scene.  EMS arrived and intubated pt for airway protection.  pt was in sinus rhythm.  only medical history is alcohol abuse, but no history of seizure or withdrawal symptoms.

## 2022-05-04 NOTE — ED PROVIDER NOTE - CLINICAL SUMMARY MEDICAL DECISION MAKING FREE TEXT BOX
40 yo M with PMH of ETOH abuse, BIBEMS to ED c/o unresponsive while at work today, given Narcan, intubated by EMS. Pt breathing spontaneously. Pt placed on ventilator. Plan: labs, CT head & C-spine to r/o acute ICH, EKG, and reassess. 42 yo M with PMH of ETOH abuse, BIBEMS to ED c/o unresponsive while at work today, given Narcan, intubated by EMS. Pt breathing spontaneously. Pt placed on ventilator. Plan: labs, trop, CT head & C-spine to r/o acute ICH, EKG, and reassess.

## 2022-05-04 NOTE — CHART NOTE - NSCHARTNOTEFT_GEN_A_CORE
CAPRINI SCORE [CLOT]    AGE RELATED RISK FACTORS                                                       MOBILITY RELATED FACTORS  [x ] Age 41-60 years                                            (1 Point)                  [ ] Bed rest                                                        (1 Point)  [ ] Age: 61-74 years                                           (2 Points)                 [ ] Plaster cast                                                   (2 Points)  [ ] Age= 75 years                                              (3 Points)                 [ ] Bed bound for more than 72 hours                 (2 Points)    DISEASE RELATED RISK FACTORS                                               GENDER SPECIFIC FACTORS  [ ] Edema in the lower extremities                       (1 Point)                  [ ] Pregnancy                                                     (1 Point)  [ ] Varicose veins                                               (1 Point)                  [ ] Post-partum < 6 weeks                                   (1 Point)             [ ] BMI > 25 Kg/m2                                            (1 Point)                  [ ] Hormonal therapy  or oral contraception          (1 Point)                 [ ] Sepsis (in the previous month)                        (1 Point)                  [ ] History of pregnancy complications                 (1 point)  [ ] Pneumonia or serious lung disease                                               [ ] Unexplained or recurrent                     (1 Point)           (in the previous month)                               (1 Point)  [ ] Abnormal pulmonary function test                     (1 Point)                 SURGERY RELATED RISK FACTORS  [ ] Acute myocardial infarction                              (1 Point)                 [ ]  Section                                             (1 Point)  [ ] Congestive heart failure (in the previous month)  (1 Point)               [ ] Minor surgery                                                  (1 Point)   [ ] Inflammatory bowel disease                             (1 Point)                 [ ] Arthroscopic surgery                                        (2 Points)  [ ] Central venous access                                      (2 Points)                [ ] General surgery lasting more than 45 minutes   (2 Points)       [x ] Stroke (in the previous month)                          (5 Points)               [ ] Elective arthroplasty                                         (5 Points)                                                                                                                                               HEMATOLOGY RELATED FACTORS                                                 TRAUMA RELATED RISK FACTORS  [ ] Prior episodes of VTE                                     (3 Points)                [ ] Fracture of the hip, pelvis, or leg                       (5 Points)  [ ] Positive family history for VTE                         (3 Points)                 [ ] Acute spinal cord injury (in the previous month)  (5 Points)  [ ] Prothrombin 32392 A                                     (3 Points)                 [ ] Paralysis  (less than 1 month)                             (5 Points)  [ ] Factor V Leiden                                             (3 Points)                  [ ] Multiple Trauma within 1 month                        (5 Points)  [ ] Lupus anticoagulants                                     (3 Points)                                                           [ ] Anticardiolipin antibodies                               (3 Points)                                                       [ ] High homocysteine in the blood                      (3 Points)                                             [ ] Other congenital or acquired thrombophilia      (3 Points)                                                [ ] Heparin induced thrombocytopenia                  (3 Points)                                          Total Score [     6     ]    Caprini Score 0 - 2:  Low Risk, No VTE Prophylaxis required for most patients, encourage ambulation  Caprini Score 3 - 6:  At Risk, pharmacologic VTE prophylaxis is indicated for most patients (in the absence of a contraindication)  Caprini Score Greater than or = 7:  High Risk, pharmacologic VTE prophylaxis is indicated for most patients (in the absence of a contraindication)

## 2022-05-04 NOTE — CHART NOTE - NSCHARTNOTEFT_GEN_A_CORE
40yo man adm intubated for SAH HH5 MF4, no immediate family available for consent, given likelihood of ruptured aneurysm and risk of rerupture/further deterioration, two-physician consent for urgent conventional cerebral angiogram warranted.     Jonathan Harrell MD  Attending Neurointensivist  [phone]

## 2022-05-04 NOTE — H&P ADULT - HISTORY OF PRESENT ILLNESS
41M, PMH EtOH abuse, found unresponsive at work. Got intubated. CT shows MF4 SAH. 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 Exam: GCS 4T, no EO, pupils 2 sluggishly reactive, has brainstem reflexes, extensor x4

## 2022-05-04 NOTE — CHART NOTE - NSCHARTNOTEFT_GEN_A_CORE
CAPRINI SCORE [CLOT]    AGE RELATED RISK FACTORS                                                       MOBILITY RELATED FACTORS  [ xxxx] Age 41-60 years                                            (1 Point)                  [ ] Bed rest                                                        (1 Point)  [ ] Age: 61-74 years                                           (2 Points)                 [ ] Plaster cast                                                   (2 Points)  [ ] Age= 75 years                                              (3 Points)                 [ ] Bed bound for more than 72 hours                 (2 Points)    DISEASE RELATED RISK FACTORS                                               GENDER SPECIFIC FACTORS  [ ] Edema in the lower extremities                       (1 Point)                  [ ] Pregnancy                                                     (1 Point)  [ ] Varicose veins                                               (1 Point)                  [ ] Post-partum < 6 weeks                                   (1 Point)             [ ] BMI > 25 Kg/m2                                            (1 Point)                  [ ] Hormonal therapy  or oral contraception          (1 Point)                 [ ] Sepsis (in the previous month)                        (1 Point)                  [ ] History of pregnancy complications                 (1 point)  [ ] Pneumonia or serious lung disease                                               [ ] Unexplained or recurrent                     (1 Point)           (in the previous month)                               (1 Point)  [ ] Abnormal pulmonary function test                     (1 Point)                 SURGERY RELATED RISK FACTORS  [ ] Acute myocardial infarction                              (1 Point)                 [ ]  Section                                             (1 Point)  [ ] Congestive heart failure (in the previous month)  (1 Point)               [ ] Minor surgery                                                  (1 Point)   [ ] Inflammatory bowel disease                             (1 Point)                 [ ] Arthroscopic surgery                                        (2 Points)  [ ] Central venous access                                      (2 Points)                [ ] General surgery lasting more than 45 minutes   (2 Points)       [ ] Stroke (in the previous month)                          (5 Points)               [ ] Elective arthroplasty                                         (5 Points)                                                                                                                                               HEMATOLOGY RELATED FACTORS                                                 TRAUMA RELATED RISK FACTORS  [ ] Prior episodes of VTE                                     (3 Points)                [ ] Fracture of the hip, pelvis, or leg                       (5 Points)  [ ] Positive family history for VTE                         (3 Points)                 [ ] Acute spinal cord injury (in the previous month)  (5 Points)  [ ] Prothrombin 02456 A                                     (3 Points)                 [ ] Paralysis  (less than 1 month)                             (5 Points)  [ ] Factor V Leiden                                             (3 Points)                  [ ] Multiple Trauma within 1 month                        (5 Points)  [ ] Lupus anticoagulants                                     (3 Points)                                                           [ ] Anticardiolipin antibodies                               (3 Points)                                                       [ ] High homocysteine in the blood                      (3 Points)                                             [ ] Other congenital or acquired thrombophilia      (3 Points)                                                [ ] Heparin induced thrombocytopenia                  (3 Points)                                          Total Score [       1   ]    Caprini Score 0 - 2:  Low Risk, No VTE Prophylaxis required for most patients, encourage ambulation  Caprini Score 3 - 6:  At Risk, pharmacologic VTE prophylaxis is indicated for most patients (in the absence of a contraindication)  Caprini Score Greater than or = 7:  High Risk, pharmacologic VTE prophylaxis is indicated for most patients (in the absence of a contraindication)

## 2022-05-05 NOTE — PROGRESS NOTE ADULT - ASSESSMENT
Patient seen and examined at bedside.  CTH shows diffuse cerebral edema and early signs of infarction  minimal CSF remaining  Now DNR  No further surgical options available given refractory intracranial hypotension, CT scan, and exam

## 2022-05-05 NOTE — PROVIDER CONTACT NOTE (OTHER) - SITUATION
Patient's ICP increased from previously 29 to 60. Right pupil now 5 round and fixed to equal with left pupil.

## 2022-05-05 NOTE — PROGRESS NOTE ADULT - SUBJECTIVE AND OBJECTIVE BOX
NSCU Progress Note    Assessment/Hospital Course:    41M, PMH EtOH abuse, found unresponsive at work, intubated in the field, presented to [facility] with CT shows MF4 diffuse SAH and HCP,CTA with 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 s/p emergent EVD, also with c/f ??seizure s/p keppra load, txer to [facility] for further management    24 Hour Events/Subjective:  - SAH HH5 mF4 PBD0  - EVD@10 ICP 8-11 with marcie bloody ouput  - poor exam, plan for repeat scan and angio    REVIEW OF SYSTEMS: [x] Unable to Assess due to neurologic exam   [ ] All ROS addressed below are non-contributory, except:  Neuro: [ ] Headache [ ] Back pain [ ] Numbness [ ] Weakness [ ] Ataxia [ ] Dizziness [ ] Aphasia [ ] Dysarthria [ ] Visual disturbance  Resp: [ ] Shortness of breath/dyspnea [ ] Orthopnea [ ] Cough  CV: [ ] Chest pain [ ] Palpitation [ ] Lightheadedness [ ] Syncope  Renal: [ ] Thirst [ ] Edema  GI: [ ] Nausea [ ] Emesis [ ] Abdominal pain [ ] Constipation [ ] Diarrhea  Hem: [ ] Hematemesis [ ] bBright red blood per rectum  ID: [ ] Fever [ ] Chills [ ] Dysuria  ENT: [ ] Rhinorrhea    VITALS:   - T(C): 36.6 (05-04-22 @ 14:50), Max: 36.6 (05-04-22 @ 14:50)  T(F): 97.9 (05-04-22 @ 14:50), Max: 97.9 (05-04-22 @ 14:50)  HR: 62 (05-04-22 @ 15:50) (58 - 94)  BP: 120/77 (05-04-22 @ 14:50) (105/81 - 131/80)  ABP: --  ABP(mean): --  RR: 18 (05-04-22 @ 15:50) (16 - 29)  SpO2: 98% (05-04-22 @ 15:50) (93% - 100%)      IMAGING/DATA:   - Reviewed    MEDICATIONS: reviewed      PHYSICAL EXAM:    General: ett to vent  CVS: RRR  Pulm: CTAB  GI: Soft, NTND  Extremities: No LE Edema  Neuro: intubated, 2mm sluggish reactive (difficult to assess), b/l, weak cough/gag, extensorx4   NSCU Progress Note    Assessment/Hospital Course:    41M, PMH EtOH abuse, found unresponsive at work, intubated in the field, presented to [facility] with CT shows MF4 diffuse SAH and HCP,CTA with 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 s/p emergent EVD, also with c/f ??seizure s/p keppra load, txer to [facility] for further management    24 Hour Events/Subjective:  - SAH HH5 mF4 PBD1  - EVD@10 ICP>30s  - high ICPs overnight, given mannitol 23.4, and sedated with propofol; poor exam overnight throughout the day  - 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, 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    REVIEW OF SYSTEMS: [x] Unable to Assess due to neurologic exam   [ ] All ROS addressed below are non-contributory, except:  Neuro: [ ] Headache [ ] Back pain [ ] Numbness [ ] Weakness [ ] Ataxia [ ] Dizziness [ ] Aphasia [ ] Dysarthria [ ] Visual disturbance  Resp: [ ] Shortness of breath/dyspnea [ ] Orthopnea [ ] Cough  CV: [ ] Chest pain [ ] Palpitation [ ] Lightheadedness [ ] Syncope  Renal: [ ] Thirst [ ] Edema  GI: [ ] Nausea [ ] Emesis [ ] Abdominal pain [ ] Constipation [ ] Diarrhea  Hem: [ ] Hematemesis [ ] bBright red blood per rectum  ID: [ ] Fever [ ] Chills [ ] Dysuria  ENT: [ ] Rhinorrhea    VITALS:   - Reviewed      IMAGING/DATA:   - Reviewed    MEDICATIONS: reviewed      PHYSICAL EXAM:    General: ett to vent  CVS: RRR  Pulm: CTAB  GI: Soft, NTND  Extremities: No LE Edema  Neuro: intubated, overbreathing, 2mm NR, weak cough/gag, extensorx4   NSCU Progress Note    Assessment/Hospital Course:    41M, PMH EtOH abuse, found unresponsive at work, intubated in the field, presented to [facility] with CT shows MF4 diffuse SAH and HCP,CTA with 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 s/p emergent EVD, also with c/f ??seizure s/p keppra load, txer to [facility] for further management    24 Hour Events/Subjective:  - SAH HH5 mF4 PBD1  - EVD@10 ICP>30s  - high ICPs overnight, given mannitol 23.4, and sedated with propofol; poor exam overnight throughout the day  - 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, 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    REVIEW OF SYSTEMS: [x] Unable to Assess due to neurologic exam   [ ] All ROS addressed below are non-contributory, except:  Neuro: [ ] Headache [ ] Back pain [ ] Numbness [ ] Weakness [ ] Ataxia [ ] Dizziness [ ] Aphasia [ ] Dysarthria [ ] Visual disturbance  Resp: [ ] Shortness of breath/dyspnea [ ] Orthopnea [ ] Cough  CV: [ ] Chest pain [ ] Palpitation [ ] Lightheadedness [ ] Syncope  Renal: [ ] Thirst [ ] Edema  GI: [ ] Nausea [ ] Emesis [ ] Abdominal pain [ ] Constipation [ ] Diarrhea  Hem: [ ] Hematemesis [ ] bBright red blood per rectum  ID: [ ] Fever [ ] Chills [ ] Dysuria  ENT: [ ] Rhinorrhea    VITALS:   - Reviewed    IMAGING/DATA:   - Reviewed    MEDICATIONS: reviewed    PHYSICAL EXAM:    General: ett to vent  CVS: RRR  Pulm: CTAB  GI: Soft, NTND  Extremities: No LE Edema  Neuro: intubated, overbreathing, 2mm NR, weak cough/gag, overbreathes ventilator, extensorx4

## 2022-05-05 NOTE — PHYSICAL THERAPY INITIAL EVALUATION ADULT - PRECAUTIONS/LIMITATIONS, REHAB EVAL
Hosp Course: 5/4 CTHead +diffuse SAH, mild hydrocephalus; CT neck: negative for acute injury; CTA +Left terminus ICA aneurysm; EVD pllaced; Emergent angio: left ICA terminus aneurysm, 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; Exam: GCS 4T, no EO, pupils 2 sluggishly reactive, has brainstem reflexes, extensor x4; 5/4 repeat CTH +Rt frontal ventricular catheter with slitlike ventricles, Diffuse SAH with intraventricular extension and intravascular contrast, Embolic material left carotid terminus. New lucency left occipital lobe suggesting acute infarct; 5/5 increasing ICP and worsening neuro assessment with pupils fixed/dilated;

## 2022-05-05 NOTE — PROGRESS NOTE ADULT - SUBJECTIVE AND OBJECTIVE BOX
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, 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  - DNR    VITALS:  T(C): , Max: 39 (05-05-22 @ 11:00)  HR:  (61 - 160)  BP:  (95/68 - 167/104)  ABP:  (62/50 - 146/82)  RR:  (14 - 27)  SpO2:  (92% - 100%)  Wt(kg): --  Device: Avea, Mode: AC/ CMV (Assist Control/ Continuous Mandatory Ventilation), RR (machine): 16, TV (machine): 450, FiO2: 40, PEEP: 5, ITime: 0.9, MAP: 9, PIP: 18    05-04-22 @ 07:01  -  05-05-22 @ 07:00  --------------------------------------------------------  IN: 3948 mL / OUT: 4390 mL / NET: -442 mL    05-05-22 @ 07:01  -  05-05-22 @ 18:21  --------------------------------------------------------  IN: 2358.4 mL / OUT: 3461 mL / NET: -1102.6 mL      LABS:  Na: >170 (05-05 @ 10:35), 151 (05-05 @ 03:21), 135 (05-04 @ 22:40), 138 (05-04 @ 17:24), 137 (05-04 @ 12:15)  K: 3.0 (05-05 @ 10:35), 4.8 (05-05 @ 03:21), 5.0 (05-04 @ 22:40), 4.2 (05-04 @ 17:24), 3.5 (05-04 @ 12:15)  Cl: >135 (05-05 @ 10:35), 118 (05-05 @ 03:21), 102 (05-04 @ 22:40), 101 (05-04 @ 17:24), 100 (05-04 @ 12:15)  CO2: 19 (05-05 @ 10:35), 14 (05-05 @ 03:21), 12 (05-04 @ 22:40), 17 (05-04 @ 17:24), 21 (05-04 @ 12:15)  BUN: 13 (05-05 @ 10:35), 11 (05-05 @ 03:21), 14 (05-04 @ 22:40), 20 (05-04 @ 17:24), 18 (05-04 @ 12:15)  Cr: 1.08 (05-05 @ 10:35), 0.75 (05-05 @ 03:21), 0.83 (05-04 @ 22:40), 0.78 (05-04 @ 17:24), 1.06 (05-04 @ 12:15)  Glu: 195(05-05 @ 10:35), 237(05-05 @ 03:21), 279(05-04 @ 22:40), 187(05-04 @ 17:24), 258(05-04 @ 12:15)    Hgb: 16.4 (05-04 @ 17:24), 16.1 (05-04 @ 12:15)  Hct: 47.0 (05-04 @ 17:24), 46.8 (05-04 @ 12:15)  WBC: 25.85 (05-04 @ 17:24), 14.29 (05-04 @ 12:15)  Plt: 207 (05-04 @ 17:24), 219 (05-04 @ 12:15)    INR: 1.07 05-04-22 @ 13:04  PTT: 31.9 05-04-22 @ 12:15    IMAGING:   Recent imaging studies were reviewed.    MEDICATIONS:  acetaminophen     Tablet .. 650 milliGRAM(s) Oral every 6 hours PRN  chlorhexidine 0.12% Liquid 15 milliLiter(s) Oral Mucosa every 12 hours  chlorhexidine 4% Liquid 1 Application(s) Topical <User Schedule>  chlorhexidine 4% Liquid 1 Application(s) Topical <User Schedule>  folic acid 1 milliGRAM(s) Oral daily  insulin lispro (ADMELOG) corrective regimen sliding scale   SubCutaneous every 6 hours  levETIRAcetam  IVPB 1000 milliGRAM(s) IV Intermittent once  levETIRAcetam  IVPB 1000 milliGRAM(s) IV Intermittent every 12 hours  LORazepam   Injectable 0.5 milliGRAM(s) IV Push every 30 minutes PRN  multivitamin/minerals/iron Oral Solution (CENTRUM) 15 milliLiter(s) Oral daily  ondansetron Injectable 4 milliGRAM(s) IV Push every 6 hours PRN  oxyCODONE    IR 5 milliGRAM(s) Oral every 6 hours PRN  oxyCODONE    IR 10 milliGRAM(s) Oral every 6 hours PRN  pantoprazole  Injectable 40 milliGRAM(s) IV Push daily  phenylephrine    Infusion 0.2 MICROgram(s)/kG/Min IV Continuous <Continuous>  senna 2 Tablet(s) Oral at bedtime  sodium chloride 0.225%. 1000 milliLiter(s) IV Continuous <Continuous>  sodium chloride 0.9% lock flush 10 milliLiter(s) IV Push every 1 hour PRN  sodium chloride 0.9% lock flush 10 milliLiter(s) IV Push every 1 hour PRN  thiamine IVPB 500 milliGRAM(s) IV Intermittent daily  vasopressin Infusion 0.04 Unit(s)/Min IV Continuous <Continuous>    PHYSICAL EXAM:    General: ett to vent  CVS: RRR  Pulm: CTAB  GI: Soft, NTND  Extremities: No LE Edema  Neuro: intubated, overbreathing, 2mm NR, weak cough/gag, overbreathes ventilator, extensorx4 HPI:  41M, PMH EtOH abuse, found unresponsive at work, intubated in the field, presented to [facility] with CT shows MF4 diffuse SAH and HCP,CTA with 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 s/p emergent EVD, also with c/f ??seizure s/p keppra load, txer to [facility] for further management    - SAH HH5 mF4 PBD1  - 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, 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  - DNR    VITALS:  T(C): , Max: 39 (05-05-22 @ 11:00)  HR:  (61 - 160)  BP:  (95/68 - 167/104)  ABP:  (62/50 - 146/82)  RR:  (14 - 27)  SpO2:  (92% - 100%)  Wt(kg): --  Device: Avea, Mode: AC/ CMV (Assist Control/ Continuous Mandatory Ventilation), RR (machine): 16, TV (machine): 450, FiO2: 40, PEEP: 5, ITime: 0.9, MAP: 9, PIP: 18    05-04-22 @ 07:01  -  05-05-22 @ 07:00  --------------------------------------------------------  IN: 3948 mL / OUT: 4390 mL / NET: -442 mL    05-05-22 @ 07:01  -  05-05-22 @ 18:21  --------------------------------------------------------  IN: 2358.4 mL / OUT: 3461 mL / NET: -1102.6 mL      LABS:  Na: >170 (05-05 @ 10:35), 151 (05-05 @ 03:21), 135 (05-04 @ 22:40), 138 (05-04 @ 17:24), 137 (05-04 @ 12:15)  K: 3.0 (05-05 @ 10:35), 4.8 (05-05 @ 03:21), 5.0 (05-04 @ 22:40), 4.2 (05-04 @ 17:24), 3.5 (05-04 @ 12:15)  Cl: >135 (05-05 @ 10:35), 118 (05-05 @ 03:21), 102 (05-04 @ 22:40), 101 (05-04 @ 17:24), 100 (05-04 @ 12:15)  CO2: 19 (05-05 @ 10:35), 14 (05-05 @ 03:21), 12 (05-04 @ 22:40), 17 (05-04 @ 17:24), 21 (05-04 @ 12:15)  BUN: 13 (05-05 @ 10:35), 11 (05-05 @ 03:21), 14 (05-04 @ 22:40), 20 (05-04 @ 17:24), 18 (05-04 @ 12:15)  Cr: 1.08 (05-05 @ 10:35), 0.75 (05-05 @ 03:21), 0.83 (05-04 @ 22:40), 0.78 (05-04 @ 17:24), 1.06 (05-04 @ 12:15)  Glu: 195(05-05 @ 10:35), 237(05-05 @ 03:21), 279(05-04 @ 22:40), 187(05-04 @ 17:24), 258(05-04 @ 12:15)    Hgb: 16.4 (05-04 @ 17:24), 16.1 (05-04 @ 12:15)  Hct: 47.0 (05-04 @ 17:24), 46.8 (05-04 @ 12:15)  WBC: 25.85 (05-04 @ 17:24), 14.29 (05-04 @ 12:15)  Plt: 207 (05-04 @ 17:24), 219 (05-04 @ 12:15)    INR: 1.07 05-04-22 @ 13:04  PTT: 31.9 05-04-22 @ 12:15    IMAGING:   Recent imaging studies were reviewed.    MEDICATIONS:  acetaminophen     Tablet .. 650 milliGRAM(s) Oral every 6 hours PRN  chlorhexidine 0.12% Liquid 15 milliLiter(s) Oral Mucosa every 12 hours  chlorhexidine 4% Liquid 1 Application(s) Topical <User Schedule>  chlorhexidine 4% Liquid 1 Application(s) Topical <User Schedule>  folic acid 1 milliGRAM(s) Oral daily  insulin lispro (ADMELOG) corrective regimen sliding scale   SubCutaneous every 6 hours  levETIRAcetam  IVPB 1000 milliGRAM(s) IV Intermittent once  levETIRAcetam  IVPB 1000 milliGRAM(s) IV Intermittent every 12 hours  LORazepam   Injectable 0.5 milliGRAM(s) IV Push every 30 minutes PRN  multivitamin/minerals/iron Oral Solution (CENTRUM) 15 milliLiter(s) Oral daily  ondansetron Injectable 4 milliGRAM(s) IV Push every 6 hours PRN  oxyCODONE    IR 5 milliGRAM(s) Oral every 6 hours PRN  oxyCODONE    IR 10 milliGRAM(s) Oral every 6 hours PRN  pantoprazole  Injectable 40 milliGRAM(s) IV Push daily  phenylephrine    Infusion 0.2 MICROgram(s)/kG/Min IV Continuous <Continuous>  senna 2 Tablet(s) Oral at bedtime  sodium chloride 0.225%. 1000 milliLiter(s) IV Continuous <Continuous>  sodium chloride 0.9% lock flush 10 milliLiter(s) IV Push every 1 hour PRN  sodium chloride 0.9% lock flush 10 milliLiter(s) IV Push every 1 hour PRN  thiamine IVPB 500 milliGRAM(s) IV Intermittent daily  vasopressin Infusion 0.04 Unit(s)/Min IV Continuous <Continuous>    PHYSICAL EXAM:    General: ett to vent  CVS: RRR  Pulm: CTAB  GI: Soft, NTND  Extremities: No LE Edema  Neuro: intubated, pupils 4 mm nonreactive bilateral, no cough or corneal, not over breathing, no response to pain

## 2022-05-05 NOTE — PROGRESS NOTE ADULT - ASSESSMENT
ASSESSMENT/PLAN:     HH5 mF4 subarachnoid hemorrhage, post-bleed day 0      NEURO:  - Treatment: OR vs. IR for aneurysm treatment  - Seizure (reported at OSH, unsure if accurate) Levetiracetam 1g BID  - Delayed Cerebral Ischemia Management: Serial screening TCDs, euvolemia, nimodipine  - Hydrocephalus: EVD in place @10, ICP goal <22 CPP 60-70  - Pain: PRN analgesics  hx daily EtOH per report, CIWA, thiamine, folate, mvi      PULM:  Intubated in the field for airway protection  - ETT to vent  - abg, CXR for ETT confirmation  - VAP bundle    CV:  - SBP goal 100-140 for unsecured aneurysm  - TTE  - EKG  - TSH, A1c, lipid panel  - trend lactate    RENAL:  - Fluids: 75cc/h NS until tolerating full diet  - argueta  - bmp  - goal na 135-145    GI:  - Diet: NPO  - GI prophylaxis: PPI while intubated  - Bowel regimen     Endo:  - Goal euglycemia (-180)    HEME/ONC:  - VTE prophylaxis: hold d/t fresh heme  - SCDs  - LED    ID:  monitor for fevers      at risk for deterioration/death due to ruptured aneurysm, hydrocephalus, brain compression, respiratory failure requiring intubation  icu  full code ASSESSMENT/PLAN:     HH5 mF4 subarachnoid hemorrhage, post-bleed day 1 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 etoh WD seizure  - Delayed Cerebral Ischemia Management: Serial screening TCDs, euvolemia, nimodipine  - Hydrocephalus: EVD in place @10, ICP goal <22 CPP 60-70  - hx daily EtOH per report, CIWA, thiamine, folate, mvi      PULM:  Intubated in the field for airway protection  - ETT to vent  - abg, CXR  - VAP bundle    CV:  - SBP goal 100-180   - TTE  - EKG  - TSH, A1c, lipid panel  - trend lactate    RENAL:  - Fluids: 1L IVB and c/w HTS 2%@75  - argueta  - bmp  - goal na 145-155  - serum osm, bmp    GI:  - Diet: TF, Glucerna  - GI prophylaxis: PPI while intubated  - Bowel regimen     Endo:  - Goal euglycemia (-180)    HEME/ONC:  - VTE prophylaxis: SCDs  - SCDs  - LED    ID:  - monitor for fevers  - full fever work up, cultures, low threshold to start abx    at risk for deterioration/death due to ruptured aneurysm, hydrocephalus, brain compression, respiratory failure requiring intubation  icu  full code ASSESSMENT/PLAN:     HH5 mF4 subarachnoid hemorrhage, post-bleed day 1 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 etoh WD seizure  - Delayed Cerebral Ischemia Management: Serial screening TCDs, euvolemia, nimodipine  - Hydrocephalus: EVD in place @10, ICP goal <22 CPP 60-70  - hx daily EtOH per report, CIWA, thiamine, folate, mvi    PULM:  Intubated in the field for airway protection  - ETT to vent  - abg, CXR  - VAP bundle    CV:  - SBP goal 100-180   - TTE  - EKG  - TSH, A1c, lipid panel  - trend lactate    RENAL:  - Fluids: 1L IVB and c/w HTS 2%@75  - argueta  - bmp  - goal na 145-155  - serum osm, bmp    GI:  - Diet: TF, Glucerna  - GI prophylaxis: PPI while intubated  - Bowel regimen     Endo:  - Goal euglycemia (-180)    HEME/ONC:  - VTE prophylaxis: SCDs  - SCDs  - LED    ID:  - monitor for fevers  - full fever work up, cultures, low threshold to start abx    at risk for deterioration/death due to ruptured aneurysm, hydrocephalus, brain compression, refractory intracranial hypertension, cerebral edema, respiratory failure requiring intubation  icu  full code

## 2022-05-05 NOTE — PROVIDER CONTACT NOTE (OTHER) - BACKGROUND
Left ICA terminus Aneurysm, patient went to angio 5/5, anuerysm embolized with 3 coils, upon arrival back to [facility] post angio, patient had blown left pupil with high ICP's in the 30's.

## 2022-05-05 NOTE — PROGRESS NOTE ADULT - ASSESSMENT
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 etoh WD seizure  - Delayed Cerebral Ischemia Management: Serial screening TCDs, euvolemia, nimodipine  - Hydrocephalus: EVD in place @10, ICP goal <22 CPP 60-70  - hx daily EtOH per report, CIWA, thiamine, folate, mvi    PULM:  Intubated in the field for airway protection  - ETT to vent  - abg, CXR  - VAP bundle    CV:  - SBP goal 100-180   - TTE  - EKG  - TSH, A1c, lipid panel  - trend lactate    RENAL:  - Fluids: 1L IVB and c/w HTS 2%@75  - argueta  - bmp  - goal na 145-155  - serum osm, bmp    GI:  - Diet: TF, Glucerna  - GI prophylaxis: PPI while intubated  - Bowel regimen     Endo:  - Goal euglycemia (-180)    HEME/ONC:  - VTE prophylaxis: SCDs  - SCDs  - LED    ID:  - monitor for fevers  - full fever work up, cultures, low threshold to start abx    at risk for deterioration/death due to ruptured aneurysm, hydrocephalus, brain compression, refractory intracranial hypertension, cerebral edema, respiratory failure requiring intubation  icu  full code   ASSESSMENT/PLAN:     HH5 mF4 subarachnoid hemorrhage, post-bleed day 1 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 etoh WD seizure  - Delayed Cerebral Ischemia Management: Serial screening TCDs, euvolemia, nimodipine  - Hydrocephalus: EVD in place @10, ICP goal <22 CPP 60-70  - hx daily EtOH per report, CIWA, thiamine, folate, mvi    PULM:  Intubated in the field for airway protection  - ETT to vent  - abg, CXR  - VAP bundle    CV:  - SBP goal 100-180   - TTE  - EKG  - trend lactate    RENAL:  - Fluids: Sodium Chloride 0.225% at 150 ml/hr   - argueta  - bmp  - goal na 145-155  - Free water    GI:  - Diet: TF, Glucerna  - GI prophylaxis: PPI while intubated  - Bowel regimen     Endo:  - Goal euglycemia (-180)    HEME/ONC:  - VTE prophylaxis: SCDs  - SCDs  - LED    ID:  - monitor for fevers  - full fever work up, cultures,  Zosyn Vancomycine    icu  DNR

## 2022-05-05 NOTE — CHART NOTE - NSCHARTNOTEFT_GEN_A_CORE
Palliative Care input requested by Neurosurgery team regarding medications in the setting of a compassionate extubation. Chart and labs reviewed. Assuming family is in agreement with plan to do a terminal extubation and are amenable to opioids/benzos, I would Palliative Care input requested by Neurosurgery team regarding medications in the setting of a compassionate extubation. Chart and labs reviewed. Assuming family is in agreement with plan to do a terminal extubation and are amenable to opioids and benzodiazepines, I would recommend the following:    - Prior to compassionate extubation, please give Dilaudid 0.5 mg IV x1, Ativan 0.5 mg IV x1, and Glycopyrrolate 0.4 mg IVx1  - Start Dilaudid 0.5 mg IV q 1 hr prn moderate to severe pain  - Dilaudid 0.5 mg IV q 1 hr prn dyspnea  - Ativan 0.5 mg IV q 1 hr prn anxiety and/or refractory dyspnea  - Glycopyrrolate 0.4 mg IV q 6 hrs prn for oral secretions

## 2022-05-06 NOTE — CHART NOTE - NSCHARTNOTEFT_GEN_A_CORE
Requested by the NSICU team and [facility] for bronchoscopy for transplant evaluation. Patient currently being evaluated for brain death.     Bronchoscopy performed at the bedside with BAL of the right and left lung performed. Labs ordered by primary team. Samples collected by [facility] representative     Romario Land MD   Pulmonary/Critical Care Fellow PGY-6  [facility] Pager #: [phone]  Spectra #: [id number]

## 2022-05-06 NOTE — CHART NOTE - NSCHARTNOTEFT_GEN_A_CORE
Received update from [facility] that patient is a candidate for organ donation. Palliative care will s/o at this time. Please reconsult [facility] team if clinical status changes.    Dania Love  PGY-4 Palliative and Hospice Fellow  Pager: [phone]

## 2022-05-06 NOTE — PROGRESS NOTE ADULT - ASSESSMENT
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 imaging modality    PULM:  Intubated in the field for airway protection  - ETT to vent  - VAP bundle    CV:  - SBP goal 100-180   - TTE  - EKG  - TSH, A1c, lipid panel  - trend lactate    RENAL:  - Fluids: 1L IVB and c/w HTS 2%@75  - FWB 300q6h  - argueta  - bmp  - goal na 145-155  - serum osm, bmp    GI:  - Diet: NPO  - GI prophylaxis: PPI while intubated  - Bowel regimen     Endo:  - Goal euglycemia (-180)    HEME/ONC:  - VTE prophylaxis: SCDs  - SCDs  - LED    ID:  - monitor for fevers  - full fever work up, cultures, low threshold to start abx  - zosyn empiric coverage    at risk for deterioration/death due to ruptured aneurysm, hydrocephalus, brain compression, refractory intracranial hypertension, cerebral edema, respiratory failure requiring intubation  icu  full code 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, 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      NEURO:  s/p mannitol, 23.4%NaCL and propofol without improvement in exams  - neurochecks q4h  - Keppra for SZ 1000 q12h  - Delayed Cerebral Ischemia Management: Serial screening TCDs, euvolemia, nimodipine  - Hydrocephalus: EVD in place @10, ICP goal <22 CPP 60-70  - optiminzg labs for brain death examinaion    PULM:  Intubated in the field for airway protection  - ETT to vent  - VAP bundle      CV:  - SBP goal 100-180   - TTE      RENAL:  - Fluids: D5@100cc, Albumin   - FWB 300q6h  - argueta  - bmp q6h for sodium goal 150-155      GI:  - Diet: NPO  - GI prophylaxis: PPI while intubated  - Bowel regimen     Endo:  - Goal euglycemia (-180)    HEME/ONC:  - VTE prophylaxis: SCDs  - SQL  - LED    ID:  - monitor for fevers  - full fever work up, cultures, low threshold to start abx  - zosyn empiric coverage    at risk for deterioration/death due to ruptured aneurysm, hydrocephalus, brain compression, refractory intracranial hypertension, cerebral edema, respiratory failure requiring intubation  icu  full code 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, 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  [facility] following     NEURO:  s/p mannitol, 23.4%NaCL and propofol without improvement in exams  - neurochecks q4h  - Keppra for SZ 1000 q12h  - Delayed Cerebral Ischemia Management: Serial screening TCDs, euvolemia, nimodipine  - Hydrocephalus: EVD in place @10, ICP goal <22 CPP 60-70  - optiminzg labs for brain death examinaion    PULM:  Intubated in the field for airway protection  - ETT to vent  - VAP bundle      CV:  - SBP goal 100-180   - TTE      RENAL:  - Fluids: D5@200cc, Albumin   - FWB 300q6h  - argueta  - bmp q6h for sodium goal 150-155      GI:  - Diet: NPO  - GI prophylaxis: PPI while intubated  - Bowel regimen     Endo:  - Goal euglycemia (-180)    HEME/ONC:  - VTE prophylaxis: SCDs  - SQL  - LED    ID:  - monitor for fevers  - full fever work up, cultures, low threshold to start abx  - zosyn empiric coverage    at risk for deterioration/death due to ruptured aneurysm, hydrocephalus, brain compression, refractory intracranial hypertension, cerebral edema, respiratory failure requiring intubation  icu  full code

## 2022-05-06 NOTE — PROGRESS NOTE ADULT - SUBJECTIVE AND OBJECTIVE BOX
NSCU Progress Note    Assessment/Hospital Course:    41M, PMH EtOH abuse, found unresponsive at work, intubated in the field, presented to [facility] with CT shows MF4 diffuse SAH and HCP,CTA with 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 s/p emergent EVD, also with c/f ??seizure s/p keppra load, txer to [facility] for further management    24 Hour Events/Subjective:  - SAH HH5 mF4 PBD2  - EVD@10 ICP>30s  - [facility] following    REVIEW OF SYSTEMS: [x] Unable to Assess due to neurologic exam   [ ] All ROS addressed below are non-contributory, except:  Neuro: [ ] Headache [ ] Back pain [ ] Numbness [ ] Weakness [ ] Ataxia [ ] Dizziness [ ] Aphasia [ ] Dysarthria [ ] Visual disturbance  Resp: [ ] Shortness of breath/dyspnea [ ] Orthopnea [ ] Cough  CV: [ ] Chest pain [ ] Palpitation [ ] Lightheadedness [ ] Syncope  Renal: [ ] Thirst [ ] Edema  GI: [ ] Nausea [ ] Emesis [ ] Abdominal pain [ ] Constipation [ ] Diarrhea  Hem: [ ] Hematemesis [ ] bBright red blood per rectum  ID: [ ] Fever [ ] Chills [ ] Dysuria  ENT: [ ] Rhinorrhea    VITALS:   - Reviewed    IMAGING/DATA:   - Reviewed    MEDICATIONS: reviewed    PHYSICAL EXAM:    General: ett to vent  CVS: RRR  Pulm: CTAB  GI: Soft, NTND  Extremities: No LE Edema  Neuro: intubated, overbreathing, 2mm NR, weak cough/gag, does not overbrerath, extensorx4   NSCU Progress Note    Assessment/Hospital Course:    41M, PMH EtOH abuse, found unresponsive at work, intubated in the field, presented to [facility] with CT shows MF4 diffuse SAH and HCP,CTA with 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 s/p emergent EVD, also with c/f ??seizure s/p keppra load, txer to [facility] for further management    24 Hour Events/Subjective:  - SAH HH5 mF4 PBD2  - EVD@10 ICP>30s  - [facility] following    REVIEW OF SYSTEMS: [x] Unable to Assess due to neurologic exam   [ ] All ROS addressed below are non-contributory, except:  Neuro: [ ] Headache [ ] Back pain [ ] Numbness [ ] Weakness [ ] Ataxia [ ] Dizziness [ ] Aphasia [ ] Dysarthria [ ] Visual disturbance  Resp: [ ] Shortness of breath/dyspnea [ ] Orthopnea [ ] Cough  CV: [ ] Chest pain [ ] Palpitation [ ] Lightheadedness [ ] Syncope  Renal: [ ] Thirst [ ] Edema  GI: [ ] Nausea [ ] Emesis [ ] Abdominal pain [ ] Constipation [ ] Diarrhea  Hem: [ ] Hematemesis [ ] bBright red blood per rectum  ID: [ ] Fever [ ] Chills [ ] Dysuria  ENT: [ ] Rhinorrhea    VITALS:   - Reviewed    IMAGING/DATA:   - Reviewed    MEDICATIONS: reviewed    PHYSICAL EXAM:    General: ett to vent  CVS: RRR  Pulm: CTAB  GI: Soft, NTND  Extremities: No LE Edema  Neuro: intubated, overbreathing, 2mm NR, no ciough/gag, does not overbrerath, extensorx4

## 2022-05-06 NOTE — PROGRESS NOTE ADULT - SUBJECTIVE AND OBJECTIVE BOX
HPI:  41M, PMH EtOH abuse, found unresponsive at work, intubated in the field, presented to [facility] with CT shows MF4 diffuse SAH and HCP,CTA with 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 s/p emergent EVD, also with c/f ??seizure s/p keppra load, txer to [facility] for further management    - SAH HH5 mF4 PBD2  - EVD@10 ICP>30s  - Live on following    VITALS:  T(C): , Max: 38 (05-05-22 @ 22:00)  HR:  (67 - 87)  BP:  (92/78 - 144/90)  ABP:  (74/58 - 148/97)  RR:  (16 - 22)  SpO2:  (97% - 100%)  Wt(kg): --  Device: Avea, Mode: AC/ CMV (Assist Control/ Continuous Mandatory Ventilation), RR (machine): 20, TV (machine): 500, FiO2: 40, PEEP: 5, ITime: 1, MAP: 12, PIP: 25    05-05-22 @ 07:01  -  05-06-22 @ 07:00  --------------------------------------------------------  IN: 7372.8 mL / OUT: 4053 mL / NET: 3319.8 mL    05-06-22 @ 07:01  -  05-06-22 @ 19:12  --------------------------------------------------------  IN: 4946.4 mL / OUT: 1150 mL / NET: 3796.4 mL      LABS:  Na: 164 (05-06 @ 15:45), >170 (05-06 @ 08:20), >170 (05-06 @ 06:19), >170 (05-06 @ 00:05), >170 (05-05 @ 10:35), 151 (05-05 @ 03:21), 135 (05-04 @ 22:40), 138 (05-04 @ 17:24), 137 (05-04 @ 12:15)  K: 5.4 (05-06 @ 15:45), 2.3 (05-06 @ 08:20), 2.3 (05-06 @ 06:19), 2.1 (05-06 @ 00:05), 3.0 (05-05 @ 10:35), 4.8 (05-05 @ 03:21), 5.0 (05-04 @ 22:40), 4.2 (05-04 @ 17:24), 3.5 (05-04 @ 12:15)  Cl: >135 (05-06 @ 15:45), >135 (05-06 @ 08:20), >135 (05-06 @ 06:19), >135 (05-06 @ 00:05), >135 (05-05 @ 10:35), 118 (05-05 @ 03:21), 102 (05-04 @ 22:40), 101 (05-04 @ 17:24), 100 (05-04 @ 12:15)  CO2: 17 (05-06 @ 15:45), 16 (05-06 @ 08:20), 16 (05-06 @ 06:19), 21 (05-06 @ 00:05), 19 (05-05 @ 10:35), 14 (05-05 @ 03:21), 12 (05-04 @ 22:40), 17 (05-04 @ 17:24), 21 (05-04 @ 12:15)  BUN: 20 (05-06 @ 15:45), 22 (05-06 @ 08:20), 23 (05-06 @ 06:19), 22 (05-06 @ 00:05), 13 (05-05 @ 10:35), 11 (05-05 @ 03:21), 14 (05-04 @ 22:40), 20 (05-04 @ 17:24), 18 (05-04 @ 12:15)  Cr: 1.15 (05-06 @ 15:45), 1.47 (05-06 @ 08:20), 1.50 (05-06 @ 06:19), 1.50 (05-06 @ 00:05), 1.08 (05-05 @ 10:35), 0.75 (05-05 @ 03:21), 0.83 (05-04 @ 22:40), 0.78 (05-04 @ 17:24), 1.06 (05-04 @ 12:15)  Glu: 262(05-06 @ 15:45), 148(05-06 @ 08:20), 136(05-06 @ 06:19), 125(05-06 @ 00:05), 195(05-05 @ 10:35), 237(05-05 @ 03:21), 279(05-04 @ 22:40), 187(05-04 @ 17:24), 258(05-04 @ 12:15)    Hgb: 10.7 (05-06 @ 15:45), 11.0 (05-06 @ 06:19), 11.9 (05-06 @ 00:06), 16.4 (05-04 @ 17:24), 16.1 (05-04 @ 12:15)  Hct: 33.0 (05-06 @ 15:45), 34.6 (05-06 @ 06:19), 37.0 (05-06 @ 00:06), 47.0 (05-04 @ 17:24), 46.8 (05-04 @ 12:15)  WBC: 10.53 (05-06 @ 15:45), 11.91 (05-06 @ 06:19), 13.27 (05-06 @ 00:06), 25.85 (05-04 @ 17:24), 14.29 (05-04 @ 12:15)  Plt: 94 (05-06 @ 15:45), 122 (05-06 @ 06:19), 123 (05-06 @ 00:06), 207 (05-04 @ 17:24), 219 (05-04 @ 12:15)    INR: 1.34 05-06-22 @ 15:45, 1.35 05-06-22 @ 08:20, 1.31 05-06-22 @ 00:05, 1.07 05-04-22 @ 13:04  PTT: 28.7 05-06-22 @ 15:45, 29.3 05-06-22 @ 08:20, 27.7 05-06-22 @ 00:05, 31.9 05-04-22 @ 12:15    IMAGING:   Recent imaging studies were reviewed.    MEDICATIONS:  acetaminophen     Tablet .. 650 milliGRAM(s) Oral every 6 hours PRN  chlorhexidine 0.12% Liquid 15 milliLiter(s) Oral Mucosa every 12 hours  chlorhexidine 4% Liquid 1 Application(s) Topical <User Schedule>  chlorhexidine 4% Liquid 1 Application(s) Topical <User Schedule>  dextrose 5% with potassium chloride 20 mEq/L 1000 milliLiter(s) IV Continuous <Continuous>  folic acid 1 milliGRAM(s) Oral daily  heparin   Injectable 5000 Unit(s) SubCutaneous every 8 hours  insulin lispro (ADMELOG) corrective regimen sliding scale   SubCutaneous every 6 hours  levETIRAcetam  IVPB 1000 milliGRAM(s) IV Intermittent every 12 hours  LORazepam   Injectable 0.5 milliGRAM(s) IV Push every 30 minutes PRN  multivitamin/minerals/iron Oral Solution (CENTRUM) 15 milliLiter(s) Oral daily  ondansetron Injectable 4 milliGRAM(s) IV Push every 6 hours PRN  oxyCODONE    IR 5 milliGRAM(s) Oral every 6 hours PRN  oxyCODONE    IR 10 milliGRAM(s) Oral every 6 hours PRN  pantoprazole  Injectable 40 milliGRAM(s) IV Push daily  phenylephrine    Infusion 1.4 MICROgram(s)/kG/Min IV Continuous <Continuous>  piperacillin/tazobactam IVPB.. 3.375 Gram(s) IV Intermittent every 8 hours  senna 2 Tablet(s) Oral at bedtime  sodium chloride 0.9% lock flush 10 milliLiter(s) IV Push every 1 hour PRN  sodium chloride 0.9% lock flush 10 milliLiter(s) IV Push every 1 hour PRN  vancomycin  IVPB 1000 milliGRAM(s) IV Intermittent every 12 hours  vasopressin Infusion 0.04 Unit(s)/Min IV Continuous <Continuous>    PHYSICAL EXAM:    General: ett to vent  CVS: RRR  Pulm: CTAB  GI: Soft, NTND  Extremities: No LE Edema  Neuro: intubated, pupils 4 mm nonreactive bilateral, no cough or corneal, not over breathing, no response to pain    HPI:  41M, PMH EtOH abuse, found unresponsive at work, intubated in the field, presented to [facility] with CT shows MF4 diffuse SAH and HCP,CTA with 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 s/p emergent EVD, also with c/f ??seizure s/p keppra load, txer to [facility] for further management    - SAH HH5 mF4 PBD2  - EVD@10 ICP>30s  - Live on following    VITALS:  T(C): , Max: 38 (05-05-22 @ 22:00)  HR:  (67 - 87)  BP:  (92/78 - 144/90)  ABP:  (74/58 - 148/97)  RR:  (16 - 22)  SpO2:  (97% - 100%)  Wt(kg): --  Device: Avea, Mode: AC/ CMV (Assist Control/ Continuous Mandatory Ventilation), RR (machine): 20, TV (machine): 500, FiO2: 40, PEEP: 5, ITime: 1, MAP: 12, PIP: 25    05-05-22 @ 07:01  -  05-06-22 @ 07:00  --------------------------------------------------------  IN: 7372.8 mL / OUT: 4053 mL / NET: 3319.8 mL    05-06-22 @ 07:01  -  05-06-22 @ 19:12  --------------------------------------------------------  IN: 4946.4 mL / OUT: 1150 mL / NET: 3796.4 mL      LABS:  Na: 164 (05-06 @ 15:45), >170 (05-06 @ 08:20), >170 (05-06 @ 06:19), >170 (05-06 @ 00:05), >170 (05-05 @ 10:35), 151 (05-05 @ 03:21), 135 (05-04 @ 22:40), 138 (05-04 @ 17:24), 137 (05-04 @ 12:15)  K: 5.4 (05-06 @ 15:45), 2.3 (05-06 @ 08:20), 2.3 (05-06 @ 06:19), 2.1 (05-06 @ 00:05), 3.0 (05-05 @ 10:35), 4.8 (05-05 @ 03:21), 5.0 (05-04 @ 22:40), 4.2 (05-04 @ 17:24), 3.5 (05-04 @ 12:15)  Cl: >135 (05-06 @ 15:45), >135 (05-06 @ 08:20), >135 (05-06 @ 06:19), >135 (05-06 @ 00:05), >135 (05-05 @ 10:35), 118 (05-05 @ 03:21), 102 (05-04 @ 22:40), 101 (05-04 @ 17:24), 100 (05-04 @ 12:15)  CO2: 17 (05-06 @ 15:45), 16 (05-06 @ 08:20), 16 (05-06 @ 06:19), 21 (05-06 @ 00:05), 19 (05-05 @ 10:35), 14 (05-05 @ 03:21), 12 (05-04 @ 22:40), 17 (05-04 @ 17:24), 21 (05-04 @ 12:15)  BUN: 20 (05-06 @ 15:45), 22 (05-06 @ 08:20), 23 (05-06 @ 06:19), 22 (05-06 @ 00:05), 13 (05-05 @ 10:35), 11 (05-05 @ 03:21), 14 (05-04 @ 22:40), 20 (05-04 @ 17:24), 18 (05-04 @ 12:15)  Cr: 1.15 (05-06 @ 15:45), 1.47 (05-06 @ 08:20), 1.50 (05-06 @ 06:19), 1.50 (05-06 @ 00:05), 1.08 (05-05 @ 10:35), 0.75 (05-05 @ 03:21), 0.83 (05-04 @ 22:40), 0.78 (05-04 @ 17:24), 1.06 (05-04 @ 12:15)  Glu: 262(05-06 @ 15:45), 148(05-06 @ 08:20), 136(05-06 @ 06:19), 125(05-06 @ 00:05), 195(05-05 @ 10:35), 237(05-05 @ 03:21), 279(05-04 @ 22:40), 187(05-04 @ 17:24), 258(05-04 @ 12:15)    Hgb: 10.7 (05-06 @ 15:45), 11.0 (05-06 @ 06:19), 11.9 (05-06 @ 00:06), 16.4 (05-04 @ 17:24), 16.1 (05-04 @ 12:15)  Hct: 33.0 (05-06 @ 15:45), 34.6 (05-06 @ 06:19), 37.0 (05-06 @ 00:06), 47.0 (05-04 @ 17:24), 46.8 (05-04 @ 12:15)  WBC: 10.53 (05-06 @ 15:45), 11.91 (05-06 @ 06:19), 13.27 (05-06 @ 00:06), 25.85 (05-04 @ 17:24), 14.29 (05-04 @ 12:15)  Plt: 94 (05-06 @ 15:45), 122 (05-06 @ 06:19), 123 (05-06 @ 00:06), 207 (05-04 @ 17:24), 219 (05-04 @ 12:15)    INR: 1.34 05-06-22 @ 15:45, 1.35 05-06-22 @ 08:20, 1.31 05-06-22 @ 00:05, 1.07 05-04-22 @ 13:04  PTT: 28.7 05-06-22 @ 15:45, 29.3 05-06-22 @ 08:20, 27.7 05-06-22 @ 00:05, 31.9 05-04-22 @ 12:15    IMAGING:   Recent imaging studies were reviewed.    MEDICATIONS:  acetaminophen     Tablet .. 650 milliGRAM(s) Oral every 6 hours PRN  chlorhexidine 0.12% Liquid 15 milliLiter(s) Oral Mucosa every 12 hours  chlorhexidine 4% Liquid 1 Application(s) Topical <User Schedule>  chlorhexidine 4% Liquid 1 Application(s) Topical <User Schedule>  dextrose 5% with potassium chloride 20 mEq/L 1000 milliLiter(s) IV Continuous <Continuous>  folic acid 1 milliGRAM(s) Oral daily  heparin   Injectable 5000 Unit(s) SubCutaneous every 8 hours  insulin lispro (ADMELOG) corrective regimen sliding scale   SubCutaneous every 6 hours  levETIRAcetam  IVPB 1000 milliGRAM(s) IV Intermittent every 12 hours  LORazepam   Injectable 0.5 milliGRAM(s) IV Push every 30 minutes PRN  multivitamin/minerals/iron Oral Solution (CENTRUM) 15 milliLiter(s) Oral daily  ondansetron Injectable 4 milliGRAM(s) IV Push every 6 hours PRN  oxyCODONE    IR 5 milliGRAM(s) Oral every 6 hours PRN  oxyCODONE    IR 10 milliGRAM(s) Oral every 6 hours PRN  pantoprazole  Injectable 40 milliGRAM(s) IV Push daily  phenylephrine    Infusion 1.4 MICROgram(s)/kG/Min IV Continuous <Continuous>  piperacillin/tazobactam IVPB.. 3.375 Gram(s) IV Intermittent every 8 hours  senna 2 Tablet(s) Oral at bedtime  sodium chloride 0.9% lock flush 10 milliLiter(s) IV Push every 1 hour PRN  sodium chloride 0.9% lock flush 10 milliLiter(s) IV Push every 1 hour PRN  vancomycin  IVPB 1000 milliGRAM(s) IV Intermittent every 12 hours  vasopressin Infusion 0.04 Unit(s)/Min IV Continuous <Continuous>    PHYSICAL EXAM:    General: ett to vent  CVS: RRR  Pulm: CTAB  GI: Soft, NTND  Extremities: No LE Edema  Neuro: intubated, pupils 4 mm nonreactive bilateral, no cough or corneal, not over breathing, no response to pain, no dolling

## 2022-05-06 NOTE — CHART NOTE - NSCHARTNOTESELECT_GEN_ALL_CORE
Event Note
Off Service Note
VTE risk PA/Event Note
Event Note
Event Note
Family disucssion/Event Note
Off Service Note
Pulmonary Chart Note/Event Note
vte/Event Note

## 2022-05-06 NOTE — PROCEDURE NOTE - SUPERVISORY STATEMENT
Secretions noted in both lower airways, bloody in the RLL and mucoid in the LLL. Review of CT Chest from today shows bilateral lower lobe consolidation vs atelectasis.  BAL cultures sent from both lower lobes.

## 2022-05-06 NOTE — PROGRESS NOTE ADULT - ASSESSMENT
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, 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  Live-on following     NEURO:  s/p mannitol, 23.4%NaCL and propofol without improvement in exams  - neurochecks q4h  - Keppra for SZ 1000 q12h  - Delayed Cerebral Ischemia Management: Serial screening TCDs, euvolemia, nimodipine  - Hydrocephalus: EVD in place @10, ICP goal <22 CPP 60-70  - optiminzg labs for brain death examinaion    PULM:  Intubated in the field for airway protection  - ETT to vent  - VAP bundle      CV:  - SBP goal 100-180   - TTE      RENAL:  - Fluids: D5@200cc, Albumin   - FWB 300q6h  - argueta  - bmp q6h for sodium goal 150-155      GI:  - Diet: NPO  - GI prophylaxis: PPI while intubated  - Bowel regimen     Endo:  - Goal euglycemia (-180)    HEME/ONC:  - VTE prophylaxis: SCDs  - SQL  - LED    ID:  - monitor for fevers  - full fever work up, cultures, low threshold to start abx  - zosyn empiric coverage    at risk for deterioration/death due to ruptured aneurysm, hydrocephalus, brain compression, refractory intracranial hypertension, cerebral edema, respiratory failure requiring intubation  icu  full code   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, 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  Live-on following     NEURO:  - neurochecks q4h  - Keppra for SZ 1000 q12h  - Hydrocephalus: EVD not working   - optiminzg labs for brain death examinaion    PULM:  Intubated in the field for airway protection  - ETT to vent  - VAP bundle  - Bronchoscopy was done today     CV:  - SBP goal 100-180   - TTE EF 65%      RENAL:  -Cr improving 1.15  - Fluids: D5@200cc, Albumin   - FWB 300q6h  - argueta  - bmp q6h for sodium goal 150-155      GI:  - Diet: NPO  - GI prophylaxis: PPI while intubated  - Bowel regimen     Endo:  - Goal euglycemia (-180)    HEME/ONC:  - VTE prophylaxis: SCDs  - SQ Heparin Q 8 hrs  - LED    ID:  - monitor for fevers  - zosyn, Vancomycin, empiric coverage    icu  full code   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, 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  Live-on following     NEURO:  -Brain dead, Organ Donor   - neurochecks q4h  - Keppra for SZ 1000 q12h    PULM:  Intubated in the field for airway protection  - ETT to vent  - CMV   -Chest X ray bilateral atelectasis     CV:  - SBP goal 100-180   - TTE EF 65%  - Phenylephrine gtt      RENAL:  - argueta  -I/O      GI:  - Diet: NPO  - GI prophylaxis: PPI while intubated  - Bowel regimen     Endo:  - Goal euglycemia (-180)  - ISS  -Methylprednisolone 1 gm OD  -Levothyroxine 10 mcg/hr gtt      HEME/ONC:  - VTE prophylaxis: SCDs  - SQ Heparin Q 8 hrs  - LED    ID:  - monitor for fevers  - zosyn, Vancomycin, empiric coverage    icu  full code

## 2022-05-06 NOTE — CHART NOTE - NSCHARTNOTEFT_GEN_A_CORE
Spoke with family at bedside using  ID [id number]. Family wanted an update regarding brain death examination. All questions and concerns were acknowledged and answered. Family had no further questions.

## 2022-05-06 NOTE — PROCEDURE NOTE - NSBRONCHFINDINGS_GEN_A_CORE_FT
Scant mucus seen through out the airways. Heavy amount mucus seen in the RLL and Right superior segment. BAL with 100ml of normal saline was used to lavage the RLL and right superior segment. Overall left lung airways were clear with white mucus in the left lower lobe. BAL performed with 50mL of normal saline of the left lower lobe. Bronchoscope was then removed via the ET tube.  Scant mucus seen through out the airways. Heavy amount blood stained mucus seen in the RLL and Right superior segment. BAL with 100ml of normal saline was used to lavage the RLL and right superior segment. Overall left lung airways were clear with white mucus in the left lower lobe. BAL performed with 50mL of normal saline of the left lower lobe. Bronchoscope was then removed via the ET tube.

## 2022-05-07 NOTE — PROGRESS NOTE ADULT - ATTENDING COMMENTS
Agree with/edited as appropriate above

## 2022-05-07 NOTE — PROGRESS NOTE ADULT - SUBJECTIVE AND OBJECTIVE BOX
NSCU Progress Note    Assessment/Hospital Course:    41M, PMH EtOH abuse, found unresponsive at work, intubated in the field, presented to [facility] with CT shows MF4 diffuse SAH and HCP,CTA with 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 s/p emergent EVD, also with c/f ??seizure s/p keppra load, txer to [facility] for further management    24 Hour Events/Subjective:  - SAH HH5 mF4 PBD2  - EVD@10 ICP>30s  - Live on following    REVIEW OF SYSTEMS: [x] Unable to Assess due to neurologic exam   [ ] All ROS addressed below are non-contributory, except:  Neuro: [ ] Headache [ ] Back pain [ ] Numbness [ ] Weakness [ ] Ataxia [ ] Dizziness [ ] Aphasia [ ] Dysarthria [ ] Visual disturbance  Resp: [ ] Shortness of breath/dyspnea [ ] Orthopnea [ ] Cough  CV: [ ] Chest pain [ ] Palpitation [ ] Lightheadedness [ ] Syncope  Renal: [ ] Thirst [ ] Edema  GI: [ ] Nausea [ ] Emesis [ ] Abdominal pain [ ] Constipation [ ] Diarrhea  Hem: [ ] Hematemesis [ ] bBright red blood per rectum  ID: [ ] Fever [ ] Chills [ ] Dysuria  ENT: [ ] Rhinorrhea    VITALS:   - Reviewed    IMAGING/DATA:   - Reviewed    MEDICATIONS: reviewed    PHYSICAL EXAM:    General: ett to vent  CVS: RRR  Pulm: CTAB  GI: Soft, NTND  Extremities: No LE Edema  Neuro: intubated, overbreathing, 2mm NR, no ciough/gag, does not overbrerath, extensorx4   NSCU Progress Note    Assessment/Hospital Course:    41M, PMH EtOH abuse, found unresponsive at work, intubated in the field, presented to [facility] with CT shows MF4 diffuse SAH and HCP,CTA with 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 s/p emergent EVD, also with c/f ??seizure s/p keppra load, txer to [facility] for further management    24 Hour Events/Subjective:  - plan for brain death examination today    REVIEW OF SYSTEMS: [x] Unable to Assess due to neurologic exam   [ ] All ROS addressed below are non-contributory, except:  Neuro: [ ] Headache [ ] Back pain [ ] Numbness [ ] Weakness [ ] Ataxia [ ] Dizziness [ ] Aphasia [ ] Dysarthria [ ] Visual disturbance  Resp: [ ] Shortness of breath/dyspnea [ ] Orthopnea [ ] Cough  CV: [ ] Chest pain [ ] Palpitation [ ] Lightheadedness [ ] Syncope  Renal: [ ] Thirst [ ] Edema  GI: [ ] Nausea [ ] Emesis [ ] Abdominal pain [ ] Constipation [ ] Diarrhea  Hem: [ ] Hematemesis [ ] bBright red blood per rectum  ID: [ ] Fever [ ] Chills [ ] Dysuria  ENT: [ ] Rhinorrhea    VITALS:   - Reviewed    IMAGING/DATA:   - Reviewed    MEDICATIONS: reviewed    PHYSICAL EXAM:    General: ett to vent  CVS: RRR  Pulm: CTAB  GI: Soft, NTND  Extremities: No LE Edema  Neuro: intubated, not overbreathing, 2mm NR, no ciough/gag, does not overbrerath, extensorx4   NSCU Progress Note    Assessment/Hospital Course:    41M, PMH EtOH abuse, found unresponsive at work, intubated in the field, presented to [facility] with CT shows MF4 diffuse SAH and HCP,CTA with 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 s/p emergent EVD, also with c/f ??seizure s/p keppra load, txer to [facility] for further management    24 Hour Events/Subjective:  correcting hypernatremia  remains intubated  potential brain death evaluation today     REVIEW OF SYSTEMS: [x] Unable to Assess due to neurologic exam   [ ] All ROS addressed below are non-contributory, except:  Neuro: [ ] Headache [ ] Back pain [ ] Numbness [ ] Weakness [ ] Ataxia [ ] Dizziness [ ] Aphasia [ ] Dysarthria [ ] Visual disturbance  Resp: [ ] Shortness of breath/dyspnea [ ] Orthopnea [ ] Cough  CV: [ ] Chest pain [ ] Palpitation [ ] Lightheadedness [ ] Syncope  Renal: [ ] Thirst [ ] Edema  GI: [ ] Nausea [ ] Emesis [ ] Abdominal pain [ ] Constipation [ ] Diarrhea  Hem: [ ] Hematemesis [ ] bBright red blood per rectum  ID: [ ] Fever [ ] Chills [ ] Dysuria  ENT: [ ] Rhinorrhea    VITALS:   - Reviewed    IMAGING/DATA:   - Reviewed    MEDICATIONS: reviewed    PHYSICAL EXAM:    General: ett to vent  CVS: RRR  Pulm: CTAB  GI: Soft, NTND  Extremities: No LE Edema  Neuro: intubated, not overbreathing, 2mm NR, no cough/gag, does not overbreath ventilator, no movements x4

## 2022-05-07 NOTE — PROCEDURE NOTE - NSPROCNAME_GEN_A_CORE
Arterial Puncture/Cannulation
Interventional Radiology
Arterial Puncture/Cannulation
Bronchoscopy
Central Line Insertion

## 2022-05-07 NOTE — PROGRESS NOTE ADULT - ASSESSMENT
NEURO:  s/p mannitol, 23.4%NaCL and propofol without improvement in exams  - neurochecks q4h  - Keppra for SZ 1000 q12h  - Delayed Cerebral Ischemia Management: Serial screening TCDs, euvolemia, nimodipine  - Hydrocephalus: EVD in place @10, ICP goal <22 CPP 60-70  - plan for brain death examination today, Live-On following     PULM:  Intubated in the field for airway protection  - ETT to vent  - VAP bundle      CV:  - SBP goal 100-180, MAP>65  - continue vasopressin 0.04  - titrate phenylephrine prn   - TTE      RENAL:  - Fluids: IVL  - argueta  - bmp q8h for sodium goal 150-155      GI:  - Diet: NPO  - GI prophylaxis: PPI while intubated  - Bowel regimen     Endo:  - Goal euglycemia (-180)    HEME/ONC:  - VTE prophylaxis: SCDs  - SQL  - LED    ID:  - monitor for fevers  - full fever work up, cultures, low threshold to start abx  - zosyn empiric coverage    at risk for deterioration/death due to ruptured aneurysm, hydrocephalus, brain compression, refractory intracranial hypertension, cerebral edema, respiratory failure requiring intubation  icu  full code

## 2022-05-07 NOTE — PROGRESS NOTE ADULT - ASSESSMENT
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, 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  Live-on following     NEURO:  s/p mannitol, 23.4%NaCL and propofol without improvement in exams  - neurochecks q4h  - Keppra for SZ 1000 q12h  - Delayed Cerebral Ischemia Management: Serial screening TCDs, euvolemia, nimodipine  - Hydrocephalus: EVD in place @10, ICP goal <22 CPP 60-70  - optiminzg labs for brain death examinaion    PULM:  Intubated in the field for airway protection  - ETT to vent  - VAP bundle      CV:  - SBP goal 100-180   - TTE      RENAL:  - Fluids: D5@200cc, Albumin   - FWB 300q6h  - argueta  - bmp q6h for sodium goal 150-155      GI:  - Diet: NPO  - GI prophylaxis: PPI while intubated  - Bowel regimen     Endo:  - Goal euglycemia (-180)    HEME/ONC:  - VTE prophylaxis: SCDs  - SQL  - LED    ID:  - monitor for fevers  - full fever work up, cultures, low threshold to start abx  - zosyn empiric coverage    at risk for deterioration/death due to ruptured aneurysm, hydrocephalus, brain compression, refractory intracranial hypertension, cerebral edema, respiratory failure requiring intubation  icu  full code 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, 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  Live-on following     NEURO:  s/p mannitol, 23.4%NaCL and propofol without improvement in exams  - neurochecks q4h  - Keppra for SZ 1000 q12h  - Delayed Cerebral Ischemia Management: Serial screening TCDs, euvolemia, nimodipine  - Hydrocephalus: EVD in place @10, ICP goal <22 CPP 60-70  - plan for brain death examination today    PULM:  Intubated in the field for airway protection  - ETT to vent  - VAP bundle      CV:  - SBP goal 100-180   - TTE      RENAL:  - Fluids: IVL  - argueta  - bmp q6h for sodium goal 150-155      GI:  - Diet: NPO  - GI prophylaxis: PPI while intubated  - Bowel regimen     Endo:  - Goal euglycemia (-180)    HEME/ONC:  - VTE prophylaxis: SCDs  - SQL  - LED    ID:  - monitor for fevers  - full fever work up, cultures, low threshold to start abx  - zosyn empiric coverage    at risk for deterioration/death due to ruptured aneurysm, hydrocephalus, brain compression, refractory intracranial hypertension, cerebral edema, respiratory failure requiring intubation  icu  full code ASSESSMENT/PLAN:     HH5 mF4 subarachnoid hemorrhage, post-bleed day 3 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, 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  Live-on following     NEURO:  s/p mannitol, 23.4%NaCL and propofol without improvement in exams  - neurochecks q4h  - Keppra for SZ 1000 q12h  - Delayed Cerebral Ischemia Management: Serial screening TCDs, euvolemia, nimodipine  - Hydrocephalus: EVD in place @10, ICP goal <22 CPP 60-70  - plan for brain death examination today, Live-On following     PULM:  Intubated in the field for airway protection  - ETT to vent  - VAP bundle      CV:  - SBP goal 100-180, MAP>65  - continue vasopressin 0.04  - titrate phenylephrine prn   - TTE      RENAL:  - Fluids: IVL  - argueta  - bmp q8h for sodium goal 150-155      GI:  - Diet: NPO  - GI prophylaxis: PPI while intubated  - Bowel regimen     Endo:  - Goal euglycemia (-180)    HEME/ONC:  - VTE prophylaxis: SCDs  - SQL  - LED    ID:  - monitor for fevers  - full fever work up, cultures, low threshold to start abx  - zosyn empiric coverage    at risk for deterioration/death due to ruptured aneurysm, hydrocephalus, brain compression, refractory intracranial hypertension, cerebral edema, respiratory failure requiring intubation  icu  full code

## 2022-05-07 NOTE — PROCEDURE NOTE - PROCEDURE FINDINGS AND DETAILS
single 18 gauge core biopsy obtained from right hepatic lobe. post biopsy ultrasound demonstrated no hematoma. specimen provided to onsite organ donation team.

## 2022-05-07 NOTE — PROCEDURE NOTE - NSINFORMCONSENT_GEN_A_CORE
This was an emergent procedure.
Benefits, risks, and possible complications of procedure explained to patient/caregiver who verbalized understanding and gave written consent.
organ donation
LIVE ON general consent/Benefits, risks, and possible complications of procedure explained to patient/caregiver who verbalized understanding and gave written consent.
Benefits, risks, and possible complications of procedure explained to patient/caregiver who verbalized understanding and gave written consent.

## 2022-05-07 NOTE — CONSULT NOTE ADULT - SUBJECTIVE AND OBJECTIVE BOX
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    Medications (Abx/Cardiac/Anticoagulation/Blood Products)    phenylephrine    Infusion: 5.57 mL/Hr IV Continuous (05-05 @ 18:35)  phenylephrine    Infusion: 39 mL/Hr IV Continuous (05-06 @ 23:19)    Data:  T(C): 36.8  HR: 69  BP: 142/108  RR: 20  SpO2: 100%    -WBC 8.48 / HgB 10.2 / Hct 32.0 / Plt 81  -Na 155 / Cl 121 / BUN 12 / Glucose 162  -K 3.6 / CO2 21 / Cr 1.10  -ALT 89 / Alk Phos 100 / T.Bili 1.5  -INR1.21    Assessment:   41y Male undergoing organ donation workup.    Plan:   - For parenchymal liver biopsy.  - Place IR procedure order under Dr. Blue.

## 2022-05-07 NOTE — PROCEDURE NOTE - NSPROCDETAILS_GEN_ALL_CORE
location identified, draped/prepped, sterile technique used, needle inserted/introduced/positive blood return obtained via catheter/connected to a pressurized flush line/sutured in place/hemostasis with direct pressure, dressing applied/Seldinger technique/all materials/supplies accounted for at end of procedure
guidewire recovered/lumen(s) aspirated and flushed/sterile dressing applied/sterile technique, catheter placed

## 2022-05-07 NOTE — PROGRESS NOTE ADULT - SUBJECTIVE AND OBJECTIVE BOX
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,CTA with 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 s/p emergent EVD, also with c/f ??seizure s/p keppra load, txer to [facility] for further managemen    Pronounced brain dead at 13:39 today 07/07/2022    VITALS:  T(C): , Max: 36.9 (05-06-22 @ 23:00)  HR:  (67 - 85)  BP:  (105/83 - 142/108)  ABP:  (86/56 - 168/109)  RR:  (0 - 20)  SpO2:  (86% - 100%)  Wt(kg): --  Device: Avea, Mode: AC/ CMV (Assist Control/ Continuous Mandatory Ventilation), RR (machine): 20, TV (machine): 500, FiO2: 100, PEEP: 8, ITime: 1, MAP: 16, PIP: 33    05-06-22 @ 07:01  -  05-07-22 @ 07:00  --------------------------------------------------------  IN: 9605.4 mL / OUT: 2650 mL / NET: 6955.4 mL    05-07-22 @ 07:01  -  05-07-22 @ 19:14  --------------------------------------------------------  IN: 1415.5 mL / OUT: 1245 mL / NET: 170.5 mL      LABS:  Na: 155 (05-07 @ 13:13), 154 (05-07 @ 03:45), 158 (05-06 @ 22:11), 164 (05-06 @ 15:45), >170 (05-06 @ 08:20), >170 (05-06 @ 06:19), >170 (05-06 @ 00:05), >170 (05-05 @ 10:35), 151 (05-05 @ 03:21), 135 (05-04 @ 22:40)  K: 3.6 (05-07 @ 13:13), 4.8 (05-07 @ 03:45), 5.3 (05-06 @ 22:11), 5.4 (05-06 @ 15:45), 2.3 (05-06 @ 08:20), 2.3 (05-06 @ 06:19), 2.1 (05-06 @ 00:05), 3.0 (05-05 @ 10:35), 4.8 (05-05 @ 03:21), 5.0 (05-04 @ 22:40)  Cl: 121 (05-07 @ 13:13), 125 (05-07 @ 03:45), 132 (05-06 @ 22:11), >135 (05-06 @ 15:45), >135 (05-06 @ 08:20), >135 (05-06 @ 06:19), >135 (05-06 @ 00:05), >135 (05-05 @ 10:35), 118 (05-05 @ 03:21), 102 (05-04 @ 22:40)  CO2: 21 (05-07 @ 13:13), 16 (05-07 @ 03:45), 15 (05-06 @ 22:11), 17 (05-06 @ 15:45), 16 (05-06 @ 08:20), 16 (05-06 @ 06:19), 21 (05-06 @ 00:05), 19 (05-05 @ 10:35), 14 (05-05 @ 03:21), 12 (05-04 @ 22:40)  BUN: 12 (05-07 @ 13:13), 13 (05-07 @ 03:45), 16 (05-06 @ 22:11), 20 (05-06 @ 15:45), 22 (05-06 @ 08:20), 23 (05-06 @ 06:19), 22 (05-06 @ 00:05), 13 (05-05 @ 10:35), 11 (05-05 @ 03:21), 14 (05-04 @ 22:40)  Cr: 1.10 (05-07 @ 13:13), 1.04 (05-07 @ 03:45), 1.10 (05-06 @ 22:11), 1.15 (05-06 @ 15:45), 1.47 (05-06 @ 08:20), 1.50 (05-06 @ 06:19), 1.50 (05-06 @ 00:05), 1.08 (05-05 @ 10:35), 0.75 (05-05 @ 03:21), 0.83 (05-04 @ 22:40)  Glu: 162(05-07 @ 13:13), 217(05-07 @ 03:45), 225(05-06 @ 22:11), 262(05-06 @ 15:45), 148(05-06 @ 08:20), 136(05-06 @ 06:19), 125(05-06 @ 00:05), 195(05-05 @ 10:35), 237(05-05 @ 03:21), 279(05-04 @ 22:40)    Hgb: 9.0 (05-07 @ 18:49), 10.2 (05-07 @ 13:13), 10.0 (05-07 @ 03:45), 10.5 (05-06 @ 22:11), 10.7 (05-06 @ 15:45), 11.0 (05-06 @ 06:19), 11.9 (05-06 @ 00:06)  Hct: 27.5 (05-07 @ 18:49), 32.0 (05-07 @ 13:13), 31.1 (05-07 @ 03:45), 32.6 (05-06 @ 22:11), 33.0 (05-06 @ 15:45), 34.6 (05-06 @ 06:19), 37.0 (05-06 @ 00:06)  WBC: 6.97 (05-07 @ 18:49), 8.48 (05-07 @ 13:13), 10.02 (05-07 @ 03:45), 10.73 (05-06 @ 22:11), 10.53 (05-06 @ 15:45), 11.91 (05-06 @ 06:19), 13.27 (05-06 @ 00:06)  Plt: 101 (05-07 @ 18:49), 81 (05-07 @ 13:13), 80 (05-07 @ 03:45), 87 (05-06 @ 22:11), 94 (05-06 @ 15:45), 122 (05-06 @ 06:19), 123 (05-06 @ 00:06)    INR: 1.23 05-07-22 @ 18:49, 1.21 05-07-22 @ 13:13, 1.31 05-07-22 @ 03:45, 1.33 05-06-22 @ 22:11, 1.34 05-06-22 @ 15:45, 1.35 05-06-22 @ 08:20, 1.31 05-06-22 @ 00:05  PTT: 32.9 05-07-22 @ 18:49, 32.5 05-07-22 @ 13:13, 32.8 05-07-22 @ 03:45, 28.3 05-06-22 @ 22:11, 28.7 05-06-22 @ 15:45, 29.3 05-06-22 @ 08:20, 27.7 05-06-22 @ 00:05    IMAGING:   Recent imaging studies were reviewed.    MEDICATIONS:  acetaminophen     Tablet .. 650 milliGRAM(s) Oral every 6 hours PRN  chlorhexidine 0.12% Liquid 15 milliLiter(s) Oral Mucosa every 12 hours  chlorhexidine 4% Liquid 1 Application(s) Topical <User Schedule>  chlorhexidine 4% Liquid 1 Application(s) Topical <User Schedule>  folic acid 1 milliGRAM(s) Oral daily  heparin   Injectable 5000 Unit(s) SubCutaneous every 8 hours  insulin lispro (ADMELOG) corrective regimen sliding scale   SubCutaneous every 6 hours  levETIRAcetam  IVPB 1000 milliGRAM(s) IV Intermittent every 12 hours  levothyroxine Infusion 20 MICROgram(s)/Hr IV Continuous <Continuous>  LORazepam   Injectable 0.5 milliGRAM(s) IV Push every 30 minutes PRN  multivitamin/minerals/iron Oral Solution (CENTRUM) 15 milliLiter(s) Oral daily  ondansetron Injectable 4 milliGRAM(s) IV Push every 6 hours PRN  oxyCODONE    IR 5 milliGRAM(s) Oral every 6 hours PRN  oxyCODONE    IR 10 milliGRAM(s) Oral every 6 hours PRN  pantoprazole  Injectable 40 milliGRAM(s) IV Push daily  phenylephrine    Infusion 1.4 MICROgram(s)/kG/Min IV Continuous <Continuous>  piperacillin/tazobactam IVPB.. 3.375 Gram(s) IV Intermittent every 8 hours  senna 2 Tablet(s) Oral at bedtime  sodium chloride 0.9% lock flush 10 milliLiter(s) IV Push every 1 hour PRN  sodium chloride 0.9% lock flush 10 milliLiter(s) IV Push every 1 hour PRN  vancomycin  IVPB 1000 milliGRAM(s) IV Intermittent every 12 hours  vasopressin Infusion 0.04 Unit(s)/Min IV Continuous <Continuous>

## 2022-05-07 NOTE — PROCEDURE NOTE - NSINDICATIONS_GEN_A_CORE
hypertonic/irritant infusion
blood sampling/cannulation purposes/critical patient/monitoring purposes

## 2022-05-09 PROCEDURE — 82553 CREATINE MB FRACTION: CPT

## 2022-05-09 PROCEDURE — 80048 BASIC METABOLIC PNL TOTAL CA: CPT

## 2022-05-09 PROCEDURE — 84295 ASSAY OF SERUM SODIUM: CPT

## 2022-05-09 PROCEDURE — 93970 EXTREMITY STUDY: CPT

## 2022-05-09 PROCEDURE — C1769: CPT

## 2022-05-09 PROCEDURE — 76700 US EXAM ABDOM COMPLETE: CPT

## 2022-05-09 PROCEDURE — 75898 FOLLOW-UP ANGIOGRAPHY: CPT

## 2022-05-09 PROCEDURE — P9100: CPT

## 2022-05-09 PROCEDURE — 71045 X-RAY EXAM CHEST 1 VIEW: CPT

## 2022-05-09 PROCEDURE — 93005 ELECTROCARDIOGRAM TRACING: CPT

## 2022-05-09 PROCEDURE — 87040 BLOOD CULTURE FOR BACTERIA: CPT

## 2022-05-09 PROCEDURE — 85014 HEMATOCRIT: CPT

## 2022-05-09 PROCEDURE — 85027 COMPLETE CBC AUTOMATED: CPT

## 2022-05-09 PROCEDURE — 83935 ASSAY OF URINE OSMOLALITY: CPT

## 2022-05-09 PROCEDURE — 83930 ASSAY OF BLOOD OSMOLALITY: CPT

## 2022-05-09 PROCEDURE — 81001 URINALYSIS AUTO W/SCOPE: CPT

## 2022-05-09 PROCEDURE — C1889: CPT

## 2022-05-09 PROCEDURE — 84100 ASSAY OF PHOSPHORUS: CPT

## 2022-05-09 PROCEDURE — 36600 WITHDRAWAL OF ARTERIAL BLOOD: CPT

## 2022-05-09 PROCEDURE — 76942 ECHO GUIDE FOR BIOPSY: CPT

## 2022-05-09 PROCEDURE — 85730 THROMBOPLASTIN TIME PARTIAL: CPT

## 2022-05-09 PROCEDURE — 93306 TTE W/DOPPLER COMPLETE: CPT

## 2022-05-09 PROCEDURE — 84443 ASSAY THYROID STIM HORMONE: CPT

## 2022-05-09 PROCEDURE — 82550 ASSAY OF CK (CPK): CPT

## 2022-05-09 PROCEDURE — 86901 BLOOD TYPING SEROLOGIC RH(D): CPT

## 2022-05-09 PROCEDURE — 82150 ASSAY OF AMYLASE: CPT

## 2022-05-09 PROCEDURE — 82330 ASSAY OF CALCIUM: CPT

## 2022-05-09 PROCEDURE — 86900 BLOOD TYPING SEROLOGIC ABO: CPT

## 2022-05-09 PROCEDURE — 85025 COMPLETE CBC W/AUTO DIFF WBC: CPT

## 2022-05-09 PROCEDURE — C1887: CPT

## 2022-05-09 PROCEDURE — 36224 PLACE CATH CAROTD ART: CPT

## 2022-05-09 PROCEDURE — 81005 URINALYSIS: CPT

## 2022-05-09 PROCEDURE — 85610 PROTHROMBIN TIME: CPT

## 2022-05-09 PROCEDURE — 82947 ASSAY GLUCOSE BLOOD QUANT: CPT

## 2022-05-09 PROCEDURE — 71250 CT THORAX DX C-: CPT

## 2022-05-09 PROCEDURE — 36415 COLL VENOUS BLD VENIPUNCTURE: CPT

## 2022-05-09 PROCEDURE — 84484 ASSAY OF TROPONIN QUANT: CPT

## 2022-05-09 PROCEDURE — 84132 ASSAY OF SERUM POTASSIUM: CPT

## 2022-05-09 PROCEDURE — 75894 X-RAYS TRANSCATH THERAPY: CPT

## 2022-05-09 PROCEDURE — 83735 ASSAY OF MAGNESIUM: CPT

## 2022-05-09 PROCEDURE — P9037: CPT

## 2022-05-09 PROCEDURE — 82435 ASSAY OF BLOOD CHLORIDE: CPT

## 2022-05-09 PROCEDURE — 83036 HEMOGLOBIN GLYCOSYLATED A1C: CPT

## 2022-05-09 PROCEDURE — 82565 ASSAY OF CREATININE: CPT

## 2022-05-09 PROCEDURE — 74176 CT ABD & PELVIS W/O CONTRAST: CPT

## 2022-05-09 PROCEDURE — C1894: CPT

## 2022-05-09 PROCEDURE — 85018 HEMOGLOBIN: CPT

## 2022-05-09 PROCEDURE — 94003 VENT MGMT INPAT SUBQ DAY: CPT

## 2022-05-09 PROCEDURE — 80061 LIPID PANEL: CPT

## 2022-05-09 PROCEDURE — 81003 URINALYSIS AUTO W/O SCOPE: CPT

## 2022-05-09 PROCEDURE — 83690 ASSAY OF LIPASE: CPT

## 2022-05-09 PROCEDURE — 36227 PLACE CATH XTRNL CAROTID: CPT

## 2022-05-09 PROCEDURE — 36430 TRANSFUSION BLD/BLD COMPNT: CPT

## 2022-05-09 PROCEDURE — P9047: CPT

## 2022-05-09 PROCEDURE — 70450 CT HEAD/BRAIN W/O DYE: CPT

## 2022-05-09 PROCEDURE — 47000 NEEDLE BIOPSY OF LIVER PERQ: CPT

## 2022-05-09 PROCEDURE — 80053 COMPREHEN METABOLIC PANEL: CPT

## 2022-05-09 PROCEDURE — C2628: CPT

## 2022-05-09 PROCEDURE — 86850 RBC ANTIBODY SCREEN: CPT

## 2022-05-09 PROCEDURE — 61624 TCAT PERM OCCLS/EMBOLJ CNS: CPT

## 2022-05-09 PROCEDURE — 82803 BLOOD GASES ANY COMBINATION: CPT

## 2022-05-09 PROCEDURE — 83605 ASSAY OF LACTIC ACID: CPT

## 2022-05-09 PROCEDURE — 94002 VENT MGMT INPAT INIT DAY: CPT

## 2022-05-09 PROCEDURE — 82248 BILIRUBIN DIRECT: CPT

## 2022-05-09 PROCEDURE — 80307 DRUG TEST PRSMV CHEM ANLYZR: CPT

## 2022-05-09 PROCEDURE — 93456 R HRT CORONARY ARTERY ANGIO: CPT

## 2022-05-09 PROCEDURE — 74018 RADEX ABDOMEN 1 VIEW: CPT

## 2022-05-09 PROCEDURE — 94640 AIRWAY INHALATION TREATMENT: CPT

## 2022-05-09 PROCEDURE — 36226 PLACE CATH VERTEBRAL ART: CPT

## 2022-05-09 PROCEDURE — 82962 GLUCOSE BLOOD TEST: CPT

## 2022-05-10 LAB
CULTURE RESULTS: SIGNIFICANT CHANGE UP
CULTURE RESULTS: SIGNIFICANT CHANGE UP
SPECIMEN SOURCE: SIGNIFICANT CHANGE UP
SPECIMEN SOURCE: SIGNIFICANT CHANGE UP

## 2022-07-11 PROBLEM — Z00.00 ENCOUNTER FOR PREVENTIVE HEALTH EXAMINATION: Status: ACTIVE | Noted: 2022-07-11

## 2023-09-20 NOTE — PROCEDURE NOTE - NSEVD_ATTENDPROV_GEN_ALL_CORE
Number Of Passes: 1 Medium Plastic Eye Shield Text: The ocular mucosa was anesthetized with tetracaine. Once adequate anesthesia was optained, medium plastic eye shields were inserted and remained in place until the procedure was completed. Detail Level: Zone Post-Care Instructions: I reviewed with the patient in detail post-care instructions. Patient should avoid sun until area fully healed. Roro Indication: surgical scars Wavelength: 1550nm Small Metal Eye Shield Text: The ocular mucosa was anesthetized with tetracaine. Once adequate anesthesia was optained, small metal eye shields were inserted and remained in place until the procedure was completed. Large Plastic Eye Shield Text: The ocular mucosa was anesthetized with tetracaine. Once adequate anesthesia was optained, large plastic eye shields were inserted and remained in place until the procedure was completed. Energy(Mj/Cm2): 70 Medium Metal Eye Shield Text: The ocular mucosa was anesthetized with tetracaine. Once adequate anesthesia was optained, medium metal eye shields were inserted and remained in place until the procedure was completed. Treatment Level: 5 Large Metal Eye Shield Text: The ocular mucosa was anesthetized with tetracaine. Once adequate anesthesia was optained, large metal eye shields were inserted and remained in place until the procedure was completed. Location: Use Location Override Treatment Number: 2 Number Of Passes: 4 Consent: Written consent obtained, risks reviewed including but not limited to pain and incomplete improvement. Small Plastic Eye Shield Text: The ocular mucosa was anesthetized with tetracaine. Once adequate anesthesia was optained, small plastic eye shields were inserted and remained in place until the procedure was completed. Add Post-Care Below To The Note: No Total Coverage: 14%

## 2024-06-03 NOTE — PROGRESS NOTE ADULT - SUBJECTIVE AND OBJECTIVE BOX
Call placed to patient to offer earlier surgical date with Dr. Colindres at the [facility].  No answer, generic voicemail left for patient to return call to writer at [phone] and ask for Sandee in scheduling.     PSR - Please reach out to Sandee to see if available to take call, if not, please document return call in this encounter and route to: P GYN ONC SURG SCHED POOL - COLINDRES, E   Patient seen and examined at bedside.    --Anticoagulation--    T(C): 37.2 (05-04-22 @ 20:33), Max: 37.2 (05-04-22 @ 20:33)  HR: 84 (05-05-22 @ 02:00) (58 - 118)  BP: 158/70 (05-04-22 @ 17:38) (84/68 - 158/70)  RR: 25 (05-05-22 @ 02:00) (14 - 29)  SpO2: 99% (05-05-22 @ 02:00) (92% - 100%)  Wt(kg): --    Exam:  Intubated, L pupil 5NR, R pupil 2NR, extending all extremities

## (undated) DEVICE — PREP CHLORAPREP HI-LITE ORANGE 26ML

## (undated) DEVICE — DRAPE LIGHT HANDLE COVER (BLUE)

## (undated) DEVICE — WARMING BLANKET UPPER ADULT

## (undated) DEVICE — SAW BLADE MICROAIRE STERNUM 1X34X9.4MM

## (undated) DEVICE — DRAPE SPLIT SHEET 77" X 108"

## (undated) DEVICE — NDL BIOPSY TRU-CUT 14G X 6"

## (undated) DEVICE — SYR ASEPTO

## (undated) DEVICE — ELCTR BOVIE PENCIL HANDPIECE

## (undated) DEVICE — WARMING BLANKET LOWER ADULT

## (undated) DEVICE — TUBING TUR 2 PRONG

## (undated) DEVICE — GOWN LG

## (undated) DEVICE — ELCTR HEX BLADE

## (undated) DEVICE — SUT SOFSILK 2-0 30" TIES

## (undated) DEVICE — DRAPE MAYO STAND 30"

## (undated) DEVICE — BLADE SCALPEL SAFETYLOCK #15

## (undated) DEVICE — VENODYNE/SCD SLEEVE CALF LARGE

## (undated) DEVICE — GOWN TRIMAX LG

## (undated) DEVICE — TUBING SUCTION 20FT

## (undated) DEVICE — SUT SOFSILK 3-0 30" TIES

## (undated) DEVICE — SUCTION YANKAUER NO CONTROL VENT

## (undated) DEVICE — SUT SOFSILK 0 30" TIES

## (undated) DEVICE — LAP PAD 18 X 18"

## (undated) DEVICE — PACK POST MORTEM ADULT

## (undated) DEVICE — DRAPE ISOLATION BAG 20X20"

## (undated) DEVICE — GLV 8 PROTEXIS (WHITE)

## (undated) DEVICE — GLV 8.5 PROTEXIS (WHITE)

## (undated) DEVICE — POSITIONER FOAM EGG CRATE ULNAR 2PCS (PINK)

## (undated) DEVICE — SOL IRR POUR H2O 250ML

## (undated) DEVICE — DRAPE 1/2 SHEET 40X57"

## (undated) DEVICE — SOL IRR POUR NS 0.9% 500ML

## (undated) DEVICE — GLV 6.5 PROTEXIS (WHITE)

## (undated) DEVICE — DRAPE TOWEL BLUE 17" X 24"

## (undated) DEVICE — SUT SOFSILK 4-0 30" TIES

## (undated) DEVICE — STAPLER SKIN VISI-STAT 35 WIDE

## (undated) DEVICE — DRAPE 3/4 SHEET W REINFORCEMENT 56X77"

## (undated) DEVICE — SPECIMEN CONTAINER 100ML

## (undated) DEVICE — PACK MAJOR ABDOMINAL SUPINE

## (undated) DEVICE — TAPE UMBILICAL 1/8 X 18" STRANDS

## (undated) DEVICE — GLV 7 PROTEXIS (WHITE)

## (undated) DEVICE — DRAPE SLUSH / WARMER 44 X 66"

## (undated) DEVICE — BLADE SCALPEL SAFETYLOCK #10

## (undated) DEVICE — MEDICATION LABELS W MARKER

## (undated) DEVICE — DRAPE INSTRUMENT POUCH 6.75" X 11"

## (undated) DEVICE — GLV 7.5 PROTEXIS (WHITE)

## (undated) DEVICE — SUT PDS II 1 54" TP-1